# Patient Record
Sex: FEMALE | Race: WHITE | NOT HISPANIC OR LATINO
[De-identification: names, ages, dates, MRNs, and addresses within clinical notes are randomized per-mention and may not be internally consistent; named-entity substitution may affect disease eponyms.]

---

## 2017-09-12 ENCOUNTER — APPOINTMENT (OUTPATIENT)
Dept: ANTEPARTUM | Facility: CLINIC | Age: 22
End: 2017-09-12
Payer: MEDICAID

## 2017-09-12 ENCOUNTER — ASOB RESULT (OUTPATIENT)
Age: 22
End: 2017-09-12

## 2017-09-12 PROCEDURE — 76813 OB US NUCHAL MEAS 1 GEST: CPT

## 2017-09-12 PROCEDURE — 36416 COLLJ CAPILLARY BLOOD SPEC: CPT

## 2017-09-12 PROCEDURE — 76801 OB US < 14 WKS SINGLE FETUS: CPT

## 2017-11-06 ENCOUNTER — APPOINTMENT (OUTPATIENT)
Dept: ANTEPARTUM | Facility: CLINIC | Age: 22
End: 2017-11-06
Payer: MEDICAID

## 2017-11-06 ENCOUNTER — ASOB RESULT (OUTPATIENT)
Age: 22
End: 2017-11-06

## 2017-11-06 ENCOUNTER — LABORATORY RESULT (OUTPATIENT)
Age: 22
End: 2017-11-06

## 2017-11-06 PROCEDURE — 76811 OB US DETAILED SNGL FETUS: CPT

## 2017-12-09 ENCOUNTER — RESULT REVIEW (OUTPATIENT)
Age: 22
End: 2017-12-09

## 2017-12-09 ENCOUNTER — INPATIENT (INPATIENT)
Facility: HOSPITAL | Age: 22
LOS: 3 days | Discharge: HOME | End: 2017-12-13
Attending: OBSTETRICS & GYNECOLOGY | Admitting: OBSTETRICS & GYNECOLOGY
Payer: MEDICAID

## 2017-12-09 VITALS
HEART RATE: 73 BPM | OXYGEN SATURATION: 100 % | SYSTOLIC BLOOD PRESSURE: 115 MMHG | DIASTOLIC BLOOD PRESSURE: 57 MMHG | RESPIRATION RATE: 20 BRPM

## 2017-12-09 DIAGNOSIS — Z3A.00 WEEKS OF GESTATION OF PREGNANCY NOT SPECIFIED: ICD-10-CM

## 2017-12-09 DIAGNOSIS — O26.899 OTHER SPECIFIED PREGNANCY RELATED CONDITIONS, UNSPECIFIED TRIMESTER: ICD-10-CM

## 2017-12-09 LAB
BASOPHILS # BLD AUTO: 0.04 K/UL — SIGNIFICANT CHANGE UP (ref 0–0.2)
BASOPHILS NFR BLD AUTO: 0.4 % — SIGNIFICANT CHANGE UP (ref 0–2)
EOSINOPHIL # BLD AUTO: 0.18 K/UL — SIGNIFICANT CHANGE UP (ref 0–0.5)
EOSINOPHIL NFR BLD AUTO: 1.6 % — SIGNIFICANT CHANGE UP (ref 0–6)
HBV SURFACE AG SER-ACNC: NEGATIVE — SIGNIFICANT CHANGE UP
HCT VFR BLD CALC: 38.8 % — SIGNIFICANT CHANGE UP (ref 34.5–45)
HGB BLD-MCNC: 12.8 G/DL — SIGNIFICANT CHANGE UP (ref 11.5–15.5)
HIV1 AG SER QL: SIGNIFICANT CHANGE UP
HIV1+2 AB SPEC QL: SIGNIFICANT CHANGE UP
IMM GRANULOCYTES # BLD AUTO: 0.21 # — SIGNIFICANT CHANGE UP
IMM GRANULOCYTES NFR BLD AUTO: 1.9 % — HIGH (ref 0–1.5)
LYMPHOCYTES # BLD AUTO: 18.4 % — SIGNIFICANT CHANGE UP (ref 13–44)
LYMPHOCYTES # BLD AUTO: 2.06 K/UL — SIGNIFICANT CHANGE UP (ref 1–3.3)
MCHC RBC-ENTMCNC: 28.1 PG — SIGNIFICANT CHANGE UP (ref 27–34)
MCHC RBC-ENTMCNC: 33 % — SIGNIFICANT CHANGE UP (ref 32–36)
MCV RBC AUTO: 85.1 FL — SIGNIFICANT CHANGE UP (ref 80–100)
MONOCYTES # BLD AUTO: 0.79 K/UL — SIGNIFICANT CHANGE UP (ref 0–0.9)
MONOCYTES NFR BLD AUTO: 7.1 % — SIGNIFICANT CHANGE UP (ref 2–14)
NEUTROPHILS # BLD AUTO: 7.92 K/UL — HIGH (ref 1.8–7.4)
NEUTROPHILS NFR BLD AUTO: 70.6 % — SIGNIFICANT CHANGE UP (ref 43–77)
NRBC # FLD: 0 — SIGNIFICANT CHANGE UP
PLATELET # BLD AUTO: 157 K/UL — SIGNIFICANT CHANGE UP (ref 150–400)
PMV BLD: 12.8 FL — SIGNIFICANT CHANGE UP (ref 7–13)
RBC # BLD: 4.56 M/UL — SIGNIFICANT CHANGE UP (ref 3.8–5.2)
RBC # FLD: 13.8 % — SIGNIFICANT CHANGE UP (ref 10.3–14.5)
RH IG SCN BLD-IMP: POSITIVE — SIGNIFICANT CHANGE UP
WBC # BLD: 11.2 K/UL — HIGH (ref 3.8–10.5)
WBC # FLD AUTO: 11.2 K/UL — HIGH (ref 3.8–10.5)

## 2017-12-09 PROCEDURE — 88307 TISSUE EXAM BY PATHOLOGIST: CPT | Mod: 26

## 2017-12-09 RX ORDER — HYDROMORPHONE HYDROCHLORIDE 2 MG/ML
0.6 INJECTION INTRAMUSCULAR; INTRAVENOUS; SUBCUTANEOUS
Qty: 0 | Refills: 0 | Status: DISCONTINUED | OUTPATIENT
Start: 2017-12-09 | End: 2017-12-10

## 2017-12-09 RX ORDER — MAGNESIUM SULFATE 500 MG/ML
2 VIAL (ML) INJECTION
Qty: 40 | Refills: 0 | Status: DISCONTINUED | OUTPATIENT
Start: 2017-12-09 | End: 2017-12-09

## 2017-12-09 RX ORDER — MAGNESIUM SULFATE 500 MG/ML
4 VIAL (ML) INJECTION ONCE
Qty: 0 | Refills: 0 | Status: DISCONTINUED | OUTPATIENT
Start: 2017-12-09 | End: 2017-12-09

## 2017-12-09 RX ORDER — HYDROMORPHONE HYDROCHLORIDE 2 MG/ML
30 INJECTION INTRAMUSCULAR; INTRAVENOUS; SUBCUTANEOUS
Qty: 0 | Refills: 0 | Status: DISCONTINUED | OUTPATIENT
Start: 2017-12-09 | End: 2017-12-11

## 2017-12-09 RX ORDER — AMPICILLIN TRIHYDRATE 250 MG
1 CAPSULE ORAL EVERY 4 HOURS
Qty: 0 | Refills: 0 | Status: CANCELLED | OUTPATIENT
Start: 2017-12-10 | End: 2017-12-09

## 2017-12-09 RX ORDER — CITRIC ACID/SODIUM CITRATE 300-500 MG
30 SOLUTION, ORAL ORAL ONCE
Qty: 0 | Refills: 0 | Status: COMPLETED | OUTPATIENT
Start: 2017-12-09 | End: 2017-12-09

## 2017-12-09 RX ORDER — MAGNESIUM SULFATE 500 MG/ML
4 VIAL (ML) INJECTION ONCE
Qty: 0 | Refills: 0 | Status: COMPLETED | OUTPATIENT
Start: 2017-12-09 | End: 2017-12-09

## 2017-12-09 RX ORDER — SODIUM CHLORIDE 9 MG/ML
1000 INJECTION, SOLUTION INTRAVENOUS
Qty: 0 | Refills: 0 | Status: DISCONTINUED | OUTPATIENT
Start: 2017-12-09 | End: 2017-12-09

## 2017-12-09 RX ORDER — AMPICILLIN TRIHYDRATE 250 MG
2 CAPSULE ORAL ONCE
Qty: 0 | Refills: 0 | Status: DISCONTINUED | OUTPATIENT
Start: 2017-12-09 | End: 2017-12-09

## 2017-12-09 RX ORDER — FAMOTIDINE 10 MG/ML
20 INJECTION INTRAVENOUS ONCE
Qty: 0 | Refills: 0 | Status: COMPLETED | OUTPATIENT
Start: 2017-12-09 | End: 2017-12-09

## 2017-12-09 RX ORDER — SODIUM CHLORIDE 9 MG/ML
1000 INJECTION, SOLUTION INTRAVENOUS ONCE
Qty: 0 | Refills: 0 | Status: DISCONTINUED | OUTPATIENT
Start: 2017-12-09 | End: 2017-12-09

## 2017-12-09 RX ORDER — ONDANSETRON 8 MG/1
4 TABLET, FILM COATED ORAL EVERY 6 HOURS
Qty: 0 | Refills: 0 | Status: DISCONTINUED | OUTPATIENT
Start: 2017-12-09 | End: 2017-12-13

## 2017-12-09 RX ORDER — HYDROMORPHONE HYDROCHLORIDE 2 MG/ML
0.6 INJECTION INTRAMUSCULAR; INTRAVENOUS; SUBCUTANEOUS
Qty: 0 | Refills: 0 | Status: DISCONTINUED | OUTPATIENT
Start: 2017-12-09 | End: 2017-12-11

## 2017-12-09 RX ORDER — AMPICILLIN TRIHYDRATE 250 MG
CAPSULE ORAL
Qty: 0 | Refills: 0 | Status: DISCONTINUED | OUTPATIENT
Start: 2017-12-09 | End: 2017-12-09

## 2017-12-09 RX ORDER — OXYTOCIN 10 UNIT/ML
333.33 VIAL (ML) INJECTION
Qty: 20 | Refills: 0 | Status: DISCONTINUED | OUTPATIENT
Start: 2017-12-09 | End: 2017-12-09

## 2017-12-09 RX ORDER — NALOXONE HYDROCHLORIDE 4 MG/.1ML
0.1 SPRAY NASAL
Qty: 0 | Refills: 0 | Status: DISCONTINUED | OUTPATIENT
Start: 2017-12-09 | End: 2017-12-13

## 2017-12-09 RX ORDER — METOCLOPRAMIDE HCL 10 MG
10 TABLET ORAL ONCE
Qty: 0 | Refills: 0 | Status: COMPLETED | OUTPATIENT
Start: 2017-12-09 | End: 2017-12-09

## 2017-12-09 RX ADMIN — FAMOTIDINE 20 MILLIGRAM(S): 10 INJECTION INTRAVENOUS at 21:46

## 2017-12-09 RX ADMIN — Medication 30 MILLILITER(S): at 21:46

## 2017-12-09 RX ADMIN — HYDROMORPHONE HYDROCHLORIDE 0.6 MILLIGRAM(S): 2 INJECTION INTRAMUSCULAR; INTRAVENOUS; SUBCUTANEOUS at 23:40

## 2017-12-09 RX ADMIN — Medication 10 MILLIGRAM(S): at 21:46

## 2017-12-09 RX ADMIN — HYDROMORPHONE HYDROCHLORIDE 0.6 MILLIGRAM(S): 2 INJECTION INTRAMUSCULAR; INTRAVENOUS; SUBCUTANEOUS at 23:29

## 2017-12-09 RX ADMIN — HYDROMORPHONE HYDROCHLORIDE 30 MILLILITER(S): 2 INJECTION INTRAMUSCULAR; INTRAVENOUS; SUBCUTANEOUS at 23:44

## 2017-12-09 RX ADMIN — Medication 12 MILLIGRAM(S): at 21:39

## 2017-12-09 RX ADMIN — Medication 300 GRAM(S): at 21:45

## 2017-12-10 DIAGNOSIS — O26.899 OTHER SPECIFIED PREGNANCY RELATED CONDITIONS, UNSPECIFIED TRIMESTER: ICD-10-CM

## 2017-12-10 LAB
ALBUMIN SERPL ELPH-MCNC: 3.6 G/DL — SIGNIFICANT CHANGE UP (ref 3.3–5)
ALP SERPL-CCNC: 81 U/L — SIGNIFICANT CHANGE UP (ref 40–120)
ALT FLD-CCNC: 25 U/L — SIGNIFICANT CHANGE UP (ref 4–33)
AST SERPL-CCNC: 26 U/L — SIGNIFICANT CHANGE UP (ref 4–32)
BASOPHILS # BLD AUTO: 0.04 K/UL — SIGNIFICANT CHANGE UP (ref 0–0.2)
BASOPHILS # BLD AUTO: 0.04 K/UL — SIGNIFICANT CHANGE UP (ref 0–0.2)
BASOPHILS NFR BLD AUTO: 0.2 % — SIGNIFICANT CHANGE UP (ref 0–2)
BASOPHILS NFR BLD AUTO: 0.2 % — SIGNIFICANT CHANGE UP (ref 0–2)
BASOPHILS NFR SPEC: 0 % — SIGNIFICANT CHANGE UP (ref 0–2)
BILIRUB SERPL-MCNC: < 0.2 MG/DL — LOW (ref 0.2–1.2)
BLASTS # FLD: 0 % — SIGNIFICANT CHANGE UP (ref 0–0)
BUN SERPL-MCNC: 5 MG/DL — LOW (ref 7–23)
CALCIUM SERPL-MCNC: 8.7 MG/DL — SIGNIFICANT CHANGE UP (ref 8.4–10.5)
CHLORIDE SERPL-SCNC: 102 MMOL/L — SIGNIFICANT CHANGE UP (ref 98–107)
CO2 SERPL-SCNC: 23 MMOL/L — SIGNIFICANT CHANGE UP (ref 22–31)
CREAT SERPL-MCNC: 0.34 MG/DL — LOW (ref 0.5–1.3)
EOSINOPHIL # BLD AUTO: 0.01 K/UL — SIGNIFICANT CHANGE UP (ref 0–0.5)
EOSINOPHIL # BLD AUTO: 0.04 K/UL — SIGNIFICANT CHANGE UP (ref 0–0.5)
EOSINOPHIL NFR BLD AUTO: 0.1 % — SIGNIFICANT CHANGE UP (ref 0–6)
EOSINOPHIL NFR BLD AUTO: 0.2 % — SIGNIFICANT CHANGE UP (ref 0–6)
EOSINOPHIL NFR FLD: 0 % — SIGNIFICANT CHANGE UP (ref 0–6)
GIANT PLATELETS BLD QL SMEAR: PRESENT — SIGNIFICANT CHANGE UP
GLUCOSE SERPL-MCNC: 100 MG/DL — HIGH (ref 70–99)
HCT VFR BLD CALC: 37.6 % — SIGNIFICANT CHANGE UP (ref 34.5–45)
HCT VFR BLD CALC: 38.9 % — SIGNIFICANT CHANGE UP (ref 34.5–45)
HGB BLD-MCNC: 12.7 G/DL — SIGNIFICANT CHANGE UP (ref 11.5–15.5)
HGB BLD-MCNC: 12.9 G/DL — SIGNIFICANT CHANGE UP (ref 11.5–15.5)
IMM GRANULOCYTES # BLD AUTO: 0.23 # — SIGNIFICANT CHANGE UP
IMM GRANULOCYTES # BLD AUTO: 0.25 # — SIGNIFICANT CHANGE UP
IMM GRANULOCYTES NFR BLD AUTO: 1.2 % — SIGNIFICANT CHANGE UP (ref 0–1.5)
IMM GRANULOCYTES NFR BLD AUTO: 1.3 % — SIGNIFICANT CHANGE UP (ref 0–1.5)
LYMPHOCYTES # BLD AUTO: 0.88 K/UL — LOW (ref 1–3.3)
LYMPHOCYTES # BLD AUTO: 1.64 K/UL — SIGNIFICANT CHANGE UP (ref 1–3.3)
LYMPHOCYTES # BLD AUTO: 4.7 % — LOW (ref 13–44)
LYMPHOCYTES # BLD AUTO: 8.5 % — LOW (ref 13–44)
LYMPHOCYTES NFR SPEC AUTO: 2.6 % — LOW (ref 13–44)
MCHC RBC-ENTMCNC: 28.2 PG — SIGNIFICANT CHANGE UP (ref 27–34)
MCHC RBC-ENTMCNC: 29 PG — SIGNIFICANT CHANGE UP (ref 27–34)
MCHC RBC-ENTMCNC: 33.2 % — SIGNIFICANT CHANGE UP (ref 32–36)
MCHC RBC-ENTMCNC: 33.8 % — SIGNIFICANT CHANGE UP (ref 32–36)
MCV RBC AUTO: 84.9 FL — SIGNIFICANT CHANGE UP (ref 80–100)
MCV RBC AUTO: 85.8 FL — SIGNIFICANT CHANGE UP (ref 80–100)
METAMYELOCYTES # FLD: 0 % — SIGNIFICANT CHANGE UP (ref 0–1)
MONOCYTES # BLD AUTO: 0.6 K/UL — SIGNIFICANT CHANGE UP (ref 0–0.9)
MONOCYTES # BLD AUTO: 0.68 K/UL — SIGNIFICANT CHANGE UP (ref 0–0.9)
MONOCYTES NFR BLD AUTO: 3.2 % — SIGNIFICANT CHANGE UP (ref 2–14)
MONOCYTES NFR BLD AUTO: 3.5 % — SIGNIFICANT CHANGE UP (ref 2–14)
MONOCYTES NFR BLD: 2.6 % — SIGNIFICANT CHANGE UP (ref 2–9)
MORPHOLOGY BLD-IMP: NORMAL — SIGNIFICANT CHANGE UP
MYELOCYTES NFR BLD: 0 % — SIGNIFICANT CHANGE UP (ref 0–0)
NEUTROPHIL AB SER-ACNC: 92.2 % — HIGH (ref 43–77)
NEUTROPHILS # BLD AUTO: 16.72 K/UL — HIGH (ref 1.8–7.4)
NEUTROPHILS # BLD AUTO: 16.81 K/UL — HIGH (ref 1.8–7.4)
NEUTROPHILS NFR BLD AUTO: 86.3 % — HIGH (ref 43–77)
NEUTROPHILS NFR BLD AUTO: 90.6 % — HIGH (ref 43–77)
NEUTS BAND # BLD: 0 % — SIGNIFICANT CHANGE UP (ref 0–6)
NRBC # FLD: 0 — SIGNIFICANT CHANGE UP
NRBC # FLD: 0 — SIGNIFICANT CHANGE UP
OTHER - HEMATOLOGY %: 0 — SIGNIFICANT CHANGE UP
PLATELET # BLD AUTO: 145 K/UL — LOW (ref 150–400)
PLATELET # BLD AUTO: 85 K/UL — LOW (ref 150–400)
PLATELET COUNT - ESTIMATE: SIGNIFICANT CHANGE UP
PMV BLD: 12.5 FL — SIGNIFICANT CHANGE UP (ref 7–13)
PMV BLD: 12.6 FL — SIGNIFICANT CHANGE UP (ref 7–13)
POTASSIUM SERPL-MCNC: 3.9 MMOL/L — SIGNIFICANT CHANGE UP (ref 3.5–5.3)
POTASSIUM SERPL-SCNC: 3.9 MMOL/L — SIGNIFICANT CHANGE UP (ref 3.5–5.3)
PROMYELOCYTES # FLD: 0 % — SIGNIFICANT CHANGE UP (ref 0–0)
PROT SERPL-MCNC: 6.7 G/DL — SIGNIFICANT CHANGE UP (ref 6–8.3)
RBC # BLD: 4.38 M/UL — SIGNIFICANT CHANGE UP (ref 3.8–5.2)
RBC # BLD: 4.58 M/UL — SIGNIFICANT CHANGE UP (ref 3.8–5.2)
RBC # FLD: 13.7 % — SIGNIFICANT CHANGE UP (ref 10.3–14.5)
RBC # FLD: 13.7 % — SIGNIFICANT CHANGE UP (ref 10.3–14.5)
SODIUM SERPL-SCNC: 139 MMOL/L — SIGNIFICANT CHANGE UP (ref 135–145)
VARIANT LYMPHS # BLD: 2.6 % — SIGNIFICANT CHANGE UP
WBC # BLD: 18.57 K/UL — HIGH (ref 3.8–10.5)
WBC # BLD: 19.37 K/UL — HIGH (ref 3.8–10.5)
WBC # FLD AUTO: 18.57 K/UL — HIGH (ref 3.8–10.5)
WBC # FLD AUTO: 19.37 K/UL — HIGH (ref 3.8–10.5)

## 2017-12-10 RX ORDER — SODIUM CHLORIDE 9 MG/ML
1000 INJECTION, SOLUTION INTRAVENOUS
Qty: 0 | Refills: 0 | Status: DISCONTINUED | OUTPATIENT
Start: 2017-12-10 | End: 2017-12-12

## 2017-12-10 RX ORDER — SODIUM CHLORIDE 9 MG/ML
1000 INJECTION, SOLUTION INTRAVENOUS
Qty: 0 | Refills: 0 | Status: DISCONTINUED | OUTPATIENT
Start: 2017-12-10 | End: 2017-12-10

## 2017-12-10 RX ORDER — HEPARIN SODIUM 5000 [USP'U]/ML
5000 INJECTION INTRAVENOUS; SUBCUTANEOUS EVERY 12 HOURS
Qty: 0 | Refills: 0 | Status: DISCONTINUED | OUTPATIENT
Start: 2017-12-10 | End: 2017-12-13

## 2017-12-10 RX ORDER — FERROUS SULFATE 325(65) MG
325 TABLET ORAL DAILY
Qty: 0 | Refills: 0 | Status: DISCONTINUED | OUTPATIENT
Start: 2017-12-10 | End: 2017-12-13

## 2017-12-10 RX ORDER — GLYCERIN ADULT
1 SUPPOSITORY, RECTAL RECTAL AT BEDTIME
Qty: 0 | Refills: 0 | Status: DISCONTINUED | OUTPATIENT
Start: 2017-12-10 | End: 2017-12-13

## 2017-12-10 RX ORDER — TETANUS TOXOID, REDUCED DIPHTHERIA TOXOID AND ACELLULAR PERTUSSIS VACCINE, ADSORBED 5; 2.5; 8; 8; 2.5 [IU]/.5ML; [IU]/.5ML; UG/.5ML; UG/.5ML; UG/.5ML
0.5 SUSPENSION INTRAMUSCULAR ONCE
Qty: 0 | Refills: 0 | Status: DISCONTINUED | OUTPATIENT
Start: 2017-12-10 | End: 2017-12-13

## 2017-12-10 RX ORDER — LANOLIN
1 OINTMENT (GRAM) TOPICAL
Qty: 0 | Refills: 0 | Status: DISCONTINUED | OUTPATIENT
Start: 2017-12-10 | End: 2017-12-13

## 2017-12-10 RX ORDER — DIPHENHYDRAMINE HCL 50 MG
25 CAPSULE ORAL EVERY 6 HOURS
Qty: 0 | Refills: 0 | Status: DISCONTINUED | OUTPATIENT
Start: 2017-12-10 | End: 2017-12-13

## 2017-12-10 RX ORDER — OXYTOCIN 10 UNIT/ML
333.33 VIAL (ML) INJECTION
Qty: 20 | Refills: 0 | Status: DISCONTINUED | OUTPATIENT
Start: 2017-12-10 | End: 2017-12-13

## 2017-12-10 RX ORDER — SIMETHICONE 80 MG/1
80 TABLET, CHEWABLE ORAL EVERY 4 HOURS
Qty: 0 | Refills: 0 | Status: DISCONTINUED | OUTPATIENT
Start: 2017-12-10 | End: 2017-12-13

## 2017-12-10 RX ORDER — DOCUSATE SODIUM 100 MG
100 CAPSULE ORAL
Qty: 0 | Refills: 0 | Status: DISCONTINUED | OUTPATIENT
Start: 2017-12-10 | End: 2017-12-13

## 2017-12-10 RX ORDER — OXYTOCIN 10 UNIT/ML
41.67 VIAL (ML) INJECTION
Qty: 20 | Refills: 0 | Status: DISCONTINUED | OUTPATIENT
Start: 2017-12-10 | End: 2017-12-13

## 2017-12-10 RX ORDER — INFLUENZA VIRUS VACCINE 15; 15; 15; 15 UG/.5ML; UG/.5ML; UG/.5ML; UG/.5ML
0.5 SUSPENSION INTRAMUSCULAR ONCE
Qty: 0 | Refills: 0 | Status: DISCONTINUED | OUTPATIENT
Start: 2017-12-10 | End: 2017-12-13

## 2017-12-10 RX ADMIN — Medication 125 MILLIUNIT(S)/MIN: at 00:29

## 2017-12-10 RX ADMIN — HEPARIN SODIUM 5000 UNIT(S): 5000 INJECTION INTRAVENOUS; SUBCUTANEOUS at 09:41

## 2017-12-10 RX ADMIN — HYDROMORPHONE HYDROCHLORIDE 30 MILLILITER(S): 2 INJECTION INTRAMUSCULAR; INTRAVENOUS; SUBCUTANEOUS at 07:32

## 2017-12-10 RX ADMIN — SIMETHICONE 80 MILLIGRAM(S): 80 TABLET, CHEWABLE ORAL at 19:51

## 2017-12-10 RX ADMIN — Medication 325 MILLIGRAM(S): at 22:29

## 2017-12-10 RX ADMIN — HYDROMORPHONE HYDROCHLORIDE 30 MILLILITER(S): 2 INJECTION INTRAMUSCULAR; INTRAVENOUS; SUBCUTANEOUS at 19:32

## 2017-12-10 RX ADMIN — HEPARIN SODIUM 5000 UNIT(S): 5000 INJECTION INTRAVENOUS; SUBCUTANEOUS at 22:29

## 2017-12-10 RX ADMIN — SODIUM CHLORIDE 125 MILLILITER(S): 9 INJECTION, SOLUTION INTRAVENOUS at 19:40

## 2017-12-10 RX ADMIN — Medication 1000 MILLIUNIT(S)/MIN: at 00:28

## 2017-12-10 NOTE — PROGRESS NOTE ADULT - PROBLEM SELECTOR PLAN 1
- check CBC  - continue with PO analgesia  - increase ambulation  - continue regular diet  - encourage incentive spirometry  - d/c IV fluids this PM  - d/c iftikahr this PM  - incision dressing kept in place due to the fact that the pt's c/s was late    Sahara Mace MD PGY1

## 2017-12-10 NOTE — PROGRESS NOTE ADULT - SUBJECTIVE AND OBJECTIVE BOX
Patient seen and examined at bedside. No acute complaints, pain well controlled. Patient is not yet ambulating as she is in PACU. Pt is tolerating regular PO liquids. Has not yet passed flatus or had BM. Bay is still in place.    Vital Signs Last 24 Hours  T(C): --  HR: 87 (12-10-17 @ 04:00) (73 - 97)  BP: 125/69 (12-10-17 @ 04:00) (114/59 - 133/59)  RR: 15 (12-10-17 @ 04:00) (11 - 22)  SpO2: 97% (12-10-17 @ 04:00) (96% - 100%)    I&O's Summary    09 Dec 2017 07:01  -  10 Dec 2017 04:29  --------------------------------------------------------  IN: 2500 mL / OUT: 2450 mL / NET: 50 mL        Physical Exam:  General: NAD  Abdomen: soft, non-tender, non-distended, fundus firm  Incision: Pfannenstiel incision CDI, no drainage, no erythema, subcuticular suture closure, dressing in place  Pelvic: lochia wnl    Labs:  Blood Type: O Positive  Antibody Screen: --               12.9   19.37 )-----------( 145      ( 12-10 @ 00:07 )             38.9                12.8   11.20 )-----------( 157      ( 12-09 @ 22:00 )             38.8         MEDICATIONS  (STANDING):  heparin  Injectable 5000 Unit(s) SubCutaneous every 12 hours  HYDROmorphone PCA (1 mG/mL) 30 milliLiter(s) PCA Continuous PCA Continuous  influenza   Vaccine 0.5 milliLiter(s) IntraMuscular once  lactated ringers. 1000 milliLiter(s) (125 mL/Hr) IV Continuous <Continuous>  oxytocin Infusion 41.667 milliUNIT(s)/Min (125 mL/Hr) IV Continuous <Continuous>  oxytocin Infusion 333.333 milliUNIT(s)/Min (1000 mL/Hr) IV Continuous <Continuous>  oxytocin Infusion 41.667 milliUNIT(s)/Min (125 mL/Hr) IV Continuous <Continuous>    MEDICATIONS  (PRN):  HYDROmorphone  Injectable 0.6 milliGRAM(s) IV Push every 10 minutes PRN Moderate Pain  HYDROmorphone PCA (1 mG/mL) Rescue Clinician Bolus 0.6 milliGRAM(s) IV Push every 1 hour PRN Severe Pain (7 - 10)  naloxone Injectable 0.1 milliGRAM(s) IV Push every 3 minutes PRN For ANY of the following changes in patient status:  A. RR LESS THAN 10 breaths per minute, B. Oxygen saturation LESS THAN 90%, C. Sedation score of 6  ondansetron Injectable 4 milliGRAM(s) IV Push every 6 hours PRN Nausea

## 2017-12-10 NOTE — PROGRESS NOTE ADULT - SUBJECTIVE AND OBJECTIVE BOX
Postop Day  __1_ s/p   C- Section    THERAPY:  [  ] Spinal morphine   [  ] Epidural morphine   [x  ] IV PCA Hydromorphone 1 mg/ml      Sedation Score:	  [x  ] Alert	    [  ] Drowsy        [  ] Arousable	[  ] Asleep	[  ] Unresponsive    Side Effects:	  [x  ] None	     [  ] Nausea        [  ] Pruritus        [  ] Weakness   [  ] Numbness        ASSESSMENT/ PLAN   [   ] Discontinue         [  ] Continue  [ x ]Documentation and Verification of current medications     Comments:       Patient is doing well.  OOBAA. Tolerating clears.  Pain is tolerable.  No residual anesthetic issues or complications noted.

## 2017-12-11 LAB
RUBV IGG SER-ACNC: 8.8 INDEX — SIGNIFICANT CHANGE UP
RUBV IGG SER-IMP: POSITIVE — SIGNIFICANT CHANGE UP
T PALLIDUM AB TITR SER: NEGATIVE — SIGNIFICANT CHANGE UP

## 2017-12-11 RX ORDER — OXYCODONE HYDROCHLORIDE 5 MG/1
5 TABLET ORAL EVERY 4 HOURS
Qty: 0 | Refills: 0 | Status: COMPLETED | OUTPATIENT
Start: 2017-12-11 | End: 2017-12-18

## 2017-12-11 RX ORDER — IBUPROFEN 200 MG
600 TABLET ORAL EVERY 6 HOURS
Qty: 0 | Refills: 0 | Status: DISCONTINUED | OUTPATIENT
Start: 2017-12-11 | End: 2017-12-13

## 2017-12-11 RX ORDER — IBUPROFEN 200 MG
600 TABLET ORAL EVERY 6 HOURS
Qty: 0 | Refills: 0 | Status: COMPLETED | OUTPATIENT
Start: 2017-12-11 | End: 2018-11-09

## 2017-12-11 RX ORDER — OXYCODONE HYDROCHLORIDE 5 MG/1
5 TABLET ORAL
Qty: 0 | Refills: 0 | Status: DISCONTINUED | OUTPATIENT
Start: 2017-12-11 | End: 2017-12-13

## 2017-12-11 RX ORDER — OXYCODONE HYDROCHLORIDE 5 MG/1
5 TABLET ORAL EVERY 4 HOURS
Qty: 0 | Refills: 0 | Status: DISCONTINUED | OUTPATIENT
Start: 2017-12-11 | End: 2017-12-13

## 2017-12-11 RX ORDER — KETOROLAC TROMETHAMINE 30 MG/ML
30 SYRINGE (ML) INJECTION EVERY 6 HOURS
Qty: 0 | Refills: 0 | Status: DISCONTINUED | OUTPATIENT
Start: 2017-12-11 | End: 2017-12-12

## 2017-12-11 RX ORDER — OXYCODONE HYDROCHLORIDE 5 MG/1
5 TABLET ORAL
Qty: 0 | Refills: 0 | Status: COMPLETED | OUTPATIENT
Start: 2017-12-11 | End: 2017-12-18

## 2017-12-11 RX ORDER — ACETAMINOPHEN 500 MG
975 TABLET ORAL EVERY 6 HOURS
Qty: 0 | Refills: 0 | Status: DISCONTINUED | OUTPATIENT
Start: 2017-12-11 | End: 2017-12-13

## 2017-12-11 RX ADMIN — HYDROMORPHONE HYDROCHLORIDE 30 MILLILITER(S): 2 INJECTION INTRAMUSCULAR; INTRAVENOUS; SUBCUTANEOUS at 07:21

## 2017-12-11 RX ADMIN — HEPARIN SODIUM 5000 UNIT(S): 5000 INJECTION INTRAVENOUS; SUBCUTANEOUS at 22:13

## 2017-12-11 RX ADMIN — Medication 600 MILLIGRAM(S): at 11:35

## 2017-12-11 RX ADMIN — Medication 325 MILLIGRAM(S): at 12:14

## 2017-12-11 RX ADMIN — SIMETHICONE 80 MILLIGRAM(S): 80 TABLET, CHEWABLE ORAL at 22:13

## 2017-12-11 RX ADMIN — Medication 975 MILLIGRAM(S): at 10:44

## 2017-12-11 RX ADMIN — Medication 975 MILLIGRAM(S): at 19:34

## 2017-12-11 RX ADMIN — HEPARIN SODIUM 5000 UNIT(S): 5000 INJECTION INTRAVENOUS; SUBCUTANEOUS at 10:10

## 2017-12-11 RX ADMIN — SIMETHICONE 80 MILLIGRAM(S): 80 TABLET, CHEWABLE ORAL at 16:44

## 2017-12-11 RX ADMIN — Medication 600 MILLIGRAM(S): at 10:44

## 2017-12-11 RX ADMIN — SIMETHICONE 80 MILLIGRAM(S): 80 TABLET, CHEWABLE ORAL at 12:16

## 2017-12-11 RX ADMIN — Medication 100 MILLIGRAM(S): at 08:04

## 2017-12-11 RX ADMIN — Medication 975 MILLIGRAM(S): at 20:31

## 2017-12-11 RX ADMIN — Medication 975 MILLIGRAM(S): at 11:35

## 2017-12-11 RX ADMIN — SIMETHICONE 80 MILLIGRAM(S): 80 TABLET, CHEWABLE ORAL at 08:03

## 2017-12-11 RX ADMIN — Medication 1 TABLET(S): at 12:14

## 2017-12-11 NOTE — PROGRESS NOTE ADULT - PROBLEM SELECTOR PLAN 1
- Continue current pain regimen.   - Continue regular diet.  - Increase ambulation.      Rebeca Ramirez D.O. PGY1

## 2017-12-11 NOTE — PROGRESS NOTE ADULT - SUBJECTIVE AND OBJECTIVE BOX
OB Progress Note: CS, POD#2    S: 21yo  POD#2 s/p classical  section. Pain is well controlled. She is tolerating a regular diet and not yet passing flatus. She is voiding spontaneously, and ambulating without difficulty. Denies CP/SOB. Denies lightheadedness/dizziness. Denies N/V.    O:  Vitals:  Vital Signs Last 24 Hrs  T(C): 36.6 (11 Dec 2017 07:00), Max: 37.3 (10 Dec 2017 13:46)  T(F): 97.9 (11 Dec 2017 07:00), Max: 99.2 (10 Dec 2017 13:46)  HR: 76 (11 Dec 2017 07:00) (72 - 88)  BP: 108/45 (11 Dec 2017 07:00) (105/48 - 116/45)  BP(mean): --  RR: 17 (11 Dec 2017 07:00) (17 - 18)  SpO2: 96% (11 Dec 2017 07:00) (96% - 98%)    MEDICATIONS  (STANDING):  diphtheria/tetanus/pertussis (acellular) Vaccine (ADAcel) 0.5 milliLiter(s) IntraMuscular once  ferrous    sulfate 325 milliGRAM(s) Oral daily  heparin  Injectable 5000 Unit(s) SubCutaneous every 12 hours  HYDROmorphone PCA (1 mG/mL) 30 milliLiter(s) PCA Continuous PCA Continuous  influenza   Vaccine 0.5 milliLiter(s) IntraMuscular once  lactated ringers. 1000 milliLiter(s) (125 mL/Hr) IV Continuous <Continuous>  oxytocin Infusion 41.667 milliUNIT(s)/Min (125 mL/Hr) IV Continuous <Continuous>  oxytocin Infusion 333.333 milliUNIT(s)/Min (1000 mL/Hr) IV Continuous <Continuous>  oxytocin Infusion 41.667 milliUNIT(s)/Min (125 mL/Hr) IV Continuous <Continuous>  prenatal multivitamin 1 Tablet(s) Oral daily      MEDICATIONS  (PRN):  diphenhydrAMINE   Capsule 25 milliGRAM(s) Oral every 6 hours PRN Itching  docusate sodium 100 milliGRAM(s) Oral two times a day PRN Stool Softening  glycerin Suppository - Adult 1 Suppository(s) Rectal at bedtime PRN Constipation  HYDROmorphone PCA (1 mG/mL) Rescue Clinician Bolus 0.6 milliGRAM(s) IV Push every 1 hour PRN Severe Pain (7 - 10)  lanolin Ointment 1 Application(s) Topical every 3 hours PRN Sore Nipples  naloxone Injectable 0.1 milliGRAM(s) IV Push every 3 minutes PRN For ANY of the following changes in patient status:  A. RR LESS THAN 10 breaths per minute, B. Oxygen saturation LESS THAN 90%, C. Sedation score of 6  ondansetron Injectable 4 milliGRAM(s) IV Push every 6 hours PRN Nausea  simethicone 80 milliGRAM(s) Chew every 4 hours PRN Gas      Labs:  Blood type: O Positive  Rubella IgG: RPR:                           12.7   18.57<H> >-----------< 85<L>    ( 12-10 @ 04:00 )             37.6                        12.9   19.37<H> >-----------< 145<L>    ( 12-10 @ 00:07 )             38.9                        12.8   11.20<H> >-----------< 157    (  @ 22:00 )             38.8    12-10-17 @ 00:07      139  |  102  |  5<L>  ----------------------------<  100<H>  3.9   |  23  |  0.34<L>        Ca    8.7      10 Dec 2017 00:07    TPro  6.7  /  Alb  3.6  /  TBili  < 0.2<L>  /  DBili  x   /  AST  26  /  ALT  25  /  AlkPhos  81  12-10-17 @ 00:07          PE:  General: NAD  Abdomen: Soft, appropriately tender, incision c/d/i.  Pelvic: Lochia normal   Extremities: NTBL

## 2017-12-12 LAB
BASOPHILS # BLD AUTO: 0.03 K/UL — SIGNIFICANT CHANGE UP (ref 0–0.2)
BASOPHILS NFR BLD AUTO: 0.2 % — SIGNIFICANT CHANGE UP (ref 0–2)
EOSINOPHIL # BLD AUTO: 0.03 K/UL — SIGNIFICANT CHANGE UP (ref 0–0.5)
EOSINOPHIL NFR BLD AUTO: 0.2 % — SIGNIFICANT CHANGE UP (ref 0–6)
HCT VFR BLD CALC: 35.1 % — SIGNIFICANT CHANGE UP (ref 34.5–45)
HGB BLD-MCNC: 11.5 G/DL — SIGNIFICANT CHANGE UP (ref 11.5–15.5)
IMM GRANULOCYTES # BLD AUTO: 0.15 # — SIGNIFICANT CHANGE UP
IMM GRANULOCYTES NFR BLD AUTO: 1.2 % — SIGNIFICANT CHANGE UP (ref 0–1.5)
LYMPHOCYTES # BLD AUTO: 17 % — SIGNIFICANT CHANGE UP (ref 13–44)
LYMPHOCYTES # BLD AUTO: 2.14 K/UL — SIGNIFICANT CHANGE UP (ref 1–3.3)
MCHC RBC-ENTMCNC: 28.5 PG — SIGNIFICANT CHANGE UP (ref 27–34)
MCHC RBC-ENTMCNC: 32.8 % — SIGNIFICANT CHANGE UP (ref 32–36)
MCV RBC AUTO: 86.9 FL — SIGNIFICANT CHANGE UP (ref 80–100)
MONOCYTES # BLD AUTO: 0.94 K/UL — HIGH (ref 0–0.9)
MONOCYTES NFR BLD AUTO: 7.5 % — SIGNIFICANT CHANGE UP (ref 2–14)
NEUTROPHILS # BLD AUTO: 9.32 K/UL — HIGH (ref 1.8–7.4)
NEUTROPHILS NFR BLD AUTO: 73.9 % — SIGNIFICANT CHANGE UP (ref 43–77)
NRBC # FLD: 0 — SIGNIFICANT CHANGE UP
PLATELET # BLD AUTO: 161 K/UL — SIGNIFICANT CHANGE UP (ref 150–400)
PMV BLD: 12.9 FL — SIGNIFICANT CHANGE UP (ref 7–13)
RBC # BLD: 4.04 M/UL — SIGNIFICANT CHANGE UP (ref 3.8–5.2)
RBC # FLD: 14.2 % — SIGNIFICANT CHANGE UP (ref 10.3–14.5)
WBC # BLD: 12.61 K/UL — HIGH (ref 3.8–10.5)
WBC # FLD AUTO: 12.61 K/UL — HIGH (ref 3.8–10.5)

## 2017-12-12 RX ADMIN — Medication 975 MILLIGRAM(S): at 06:25

## 2017-12-12 RX ADMIN — HEPARIN SODIUM 5000 UNIT(S): 5000 INJECTION INTRAVENOUS; SUBCUTANEOUS at 11:12

## 2017-12-12 RX ADMIN — OXYCODONE HYDROCHLORIDE 5 MILLIGRAM(S): 5 TABLET ORAL at 21:41

## 2017-12-12 RX ADMIN — Medication 325 MILLIGRAM(S): at 11:12

## 2017-12-12 RX ADMIN — OXYCODONE HYDROCHLORIDE 5 MILLIGRAM(S): 5 TABLET ORAL at 22:11

## 2017-12-12 RX ADMIN — SIMETHICONE 80 MILLIGRAM(S): 80 TABLET, CHEWABLE ORAL at 06:29

## 2017-12-12 NOTE — PROGRESS NOTE ADULT - ASSESSMENT
A/P:  23yo  POD#3 s/p pClassical C/s at 25wk for footling/breech/NRFHT.   Patient is stable and is doing well post-operatively.

## 2017-12-12 NOTE — PROGRESS NOTE ADULT - PROBLEM SELECTOR PLAN 1
- Continue motrin, tylenol, oxycodone PRN for pain control.  - Increase ambulation  - Continue regular diet  - Discharge planning    Rebeca Ramirez DO PGY1

## 2017-12-12 NOTE — PROGRESS NOTE ADULT - SUBJECTIVE AND OBJECTIVE BOX
OB Postpartum Note:  Delivery, POD#3    S: 21yo  POD#3 s/p pClassical C/s at 25wk for footling/breech/NRFHT. The patient feels well.  Pain is well controlled. She is tolerating a regular diet and passing flatus. She is voiding spontaneously, and ambulating without difficulty. Denies CP/SOB. Denies lightheadedness/dizziness. Denies N/V.    O:  Vitals:  Vital Signs Last 24 Hrs  T(C): 36.7 (12 Dec 2017 06:40), Max: 37 (11 Dec 2017 14:20)  T(F): 98.1 (12 Dec 2017 06:40), Max: 98.6 (11 Dec 2017 14:20)  HR: 73 (12 Dec 2017 06:40) (71 - 79)  BP: 102/61 (12 Dec 2017 06:40) (101/44 - 118/59)  BP(mean): --  RR: 18 (12 Dec 2017 06:40) (18 - 18)  SpO2: 99% (12 Dec 2017 06:40) (97% - 100%)    MEDICATIONS  (STANDING):  acetaminophen   Tablet. 975 milliGRAM(s) Oral every 6 hours  diphtheria/tetanus/pertussis (acellular) Vaccine (ADAcel) 0.5 milliLiter(s) IntraMuscular once  ferrous    sulfate 325 milliGRAM(s) Oral daily  heparin  Injectable 5000 Unit(s) SubCutaneous every 12 hours  ibuprofen  Tablet 600 milliGRAM(s) Oral every 6 hours  influenza   Vaccine 0.5 milliLiter(s) IntraMuscular once  oxyCODONE    IR 5 milliGRAM(s) Oral every 3 hours  oxytocin Infusion 41.667 milliUNIT(s)/Min (125 mL/Hr) IV Continuous <Continuous>  oxytocin Infusion 333.333 milliUNIT(s)/Min (1000 mL/Hr) IV Continuous <Continuous>  oxytocin Infusion 41.667 milliUNIT(s)/Min (125 mL/Hr) IV Continuous <Continuous>  prenatal multivitamin 1 Tablet(s) Oral daily    MEDICATIONS  (PRN):  diphenhydrAMINE   Capsule 25 milliGRAM(s) Oral every 6 hours PRN Itching  docusate sodium 100 milliGRAM(s) Oral two times a day PRN Stool Softening  glycerin Suppository - Adult 1 Suppository(s) Rectal at bedtime PRN Constipation  lanolin Ointment 1 Application(s) Topical every 3 hours PRN Sore Nipples  naloxone Injectable 0.1 milliGRAM(s) IV Push every 3 minutes PRN For ANY of the following changes in patient status:  A. RR LESS THAN 10 breaths per minute, B. Oxygen saturation LESS THAN 90%, C. Sedation score of 6  ondansetron Injectable 4 milliGRAM(s) IV Push every 6 hours PRN Nausea  oxyCODONE    IR 5 milliGRAM(s) Oral every 4 hours PRN Severe Pain (7 - 10)  simethicone 80 milliGRAM(s) Chew every 4 hours PRN Gas      LABS:  Blood type: O Positive  Rubella IgG: Positive ( @ 22:00)  RPR: Negative                          11.5   12.61<H> >-----------< 161    (  @ 06:25 )             35.1                        12.7   18.57<H> >-----------< 85<L>    ( 12-10 @ 04:00 )             37.6                        12.9   19.37<H> >-----------< 145<L>    ( 12-10 @ 00:07 )             38.9                        12.8   11.20<H> >-----------< 157    (  @ 22:00 )             38.8    12-10-17 @ 00:07      139  |  102  |  5<L>  ----------------------------<  100<H>  3.9   |  23  |  0.34<L>        Ca    8.7      10 Dec 2017 00:07    TPro  6.7  /  Alb  3.6  /  TBili  < 0.2<L>  /  DBili  x   /  AST  26  /  ALT  25  /  AlkPhos  81  12-10-17 @ 00:07          Physical exam:  Gen: NAD  Abdomen: Soft, nontender, no distension , firm uterine fundus  Incision: Clean, dry, and intact   Pelvic: Normal lochia noted  Ext: No calf tenderness

## 2017-12-13 ENCOUNTER — TRANSCRIPTION ENCOUNTER (OUTPATIENT)
Age: 22
End: 2017-12-13

## 2017-12-13 VITALS
HEART RATE: 84 BPM | SYSTOLIC BLOOD PRESSURE: 99 MMHG | OXYGEN SATURATION: 98 % | TEMPERATURE: 98 F | DIASTOLIC BLOOD PRESSURE: 63 MMHG | RESPIRATION RATE: 18 BRPM

## 2017-12-13 RX ADMIN — SIMETHICONE 80 MILLIGRAM(S): 80 TABLET, CHEWABLE ORAL at 12:37

## 2017-12-13 RX ADMIN — Medication 100 MILLIGRAM(S): at 12:37

## 2017-12-13 NOTE — DISCHARGE NOTE OB - MEDICATION SUMMARY - MEDICATIONS TO TAKE
I will START or STAY ON the medications listed below when I get home from the hospital:    Prenatal Vitamins  -- 1 tab(s) by mouth once a day  -- Indication: For Prenatal Vitamins    Advil 200 mg oral tablet  -- 3 tab(s) by mouth every 8 hours, As Needed  -- Indication: For Pain    Tylenol  -- 2 tab(s) by mouth every 6 hours, As Needed  -- Indication: For Pain

## 2017-12-13 NOTE — DISCHARGE NOTE OB - PATIENT PORTAL LINK FT
“You can access the [employer] Patient Portal, offered by [facility], by registering with the following website: [email or web address]”

## 2017-12-13 NOTE — DISCHARGE NOTE OB - PLAN OF CARE
Return to usual daily living activities Follow up in the office in 2 weeks for incision check. Call [phone] for an appointment.   Regular Diet.

## 2017-12-13 NOTE — PROGRESS NOTE ADULT - PROBLEM SELECTOR PLAN 1
- Continue motrin, tylenol, oxycodone PRN for pain control.  - Increase ambulation  - Continue regular diet  - Discharge planning    Jackie Elizabeth, PGY2

## 2017-12-13 NOTE — DISCHARGE NOTE OB - CARE PROVIDER_API CALL
Alec Baer), OBSGYN  General  [address] [place] [address]  Phone: [phone]  Fax: [phone]

## 2017-12-13 NOTE — DISCHARGE NOTE OB - HOSPITAL COURSE
Patient presented to triage unit in  labor with cervix fully dilated and bilateral lower fetal extremities present in the vagina. She underwent an emergency classical  section under general anesthesia resulting in the delivery of viable fetus which was transferred to the  intensive care unit for evaluation and management of extreme prematurity.     The patient's postoperative course is unremarkable and on postoperative day 4, the patient meets all discharge criteria. She is discharged home in stable condition with follow up in the office in 2 weeks for incision check.

## 2017-12-13 NOTE — PROGRESS NOTE ADULT - SUBJECTIVE AND OBJECTIVE BOX
OB Postpartum Note: Primary  Delivery, POD#3    S: 21yo  POD#4 s/p pLTCS for NRFHT. The patient feels well.  Pain is well controlled. She is tolerating a regular diet and passing flatus. Pt had a bowel movement yesterday. She is voiding spontaneously, and ambulating without difficulty. Denies CP/SOB. Denies lightheadedness/dizziness. Denies N/V.    O:  Vitals:  Vital Signs Last 24 Hrs  T(C): 36.6 (13 Dec 2017 04:04), Max: 37.3 (12 Dec 2017 13:45)  T(F): 97.9 (13 Dec 2017 04:04), Max: 99.1 (12 Dec 2017 13:45)  HR: 84 (13 Dec 2017 04:04) (80 - 84)  BP: 99/63 (13 Dec 2017 04:04) (99/63 - 102/52)  RR: 18 (13 Dec 2017 04:04) (18 - 18)  SpO2: 98% (13 Dec 2017 04:04) (98% - 99%)    MEDICATIONS  (STANDING):  acetaminophen   Tablet. 975 milliGRAM(s) Oral every 6 hours  diphtheria/tetanus/pertussis (acellular) Vaccine (ADAcel) 0.5 milliLiter(s) IntraMuscular once  ferrous    sulfate 325 milliGRAM(s) Oral daily  heparin  Injectable 5000 Unit(s) SubCutaneous every 12 hours  ibuprofen  Tablet 600 milliGRAM(s) Oral every 6 hours  influenza   Vaccine 0.5 milliLiter(s) IntraMuscular once  oxyCODONE    IR 5 milliGRAM(s) Oral every 3 hours  oxytocin Infusion 41.667 milliUNIT(s)/Min (125 mL/Hr) IV Continuous <Continuous>  oxytocin Infusion 333.333 milliUNIT(s)/Min (1000 mL/Hr) IV Continuous <Continuous>  oxytocin Infusion 41.667 milliUNIT(s)/Min (125 mL/Hr) IV Continuous <Continuous>  prenatal multivitamin 1 Tablet(s) Oral daily    MEDICATIONS  (PRN):  diphenhydrAMINE   Capsule 25 milliGRAM(s) Oral every 6 hours PRN Itching  docusate sodium 100 milliGRAM(s) Oral two times a day PRN Stool Softening  glycerin Suppository - Adult 1 Suppository(s) Rectal at bedtime PRN Constipation  lanolin Ointment 1 Application(s) Topical every 3 hours PRN Sore Nipples  naloxone Injectable 0.1 milliGRAM(s) IV Push every 3 minutes PRN For ANY of the following changes in patient status:  A. RR LESS THAN 10 breaths per minute, B. Oxygen saturation LESS THAN 90%, C. Sedation score of 6  ondansetron Injectable 4 milliGRAM(s) IV Push every 6 hours PRN Nausea  oxyCODONE    IR 5 milliGRAM(s) Oral every 4 hours PRN Severe Pain (7 - 10)  simethicone 80 milliGRAM(s) Chew every 4 hours PRN Gas      LABS:  Blood type: O Positive  Rubella IgG: Positive ( @ 22:00)  RPR: Negative                          11.5   12.61<H> >-----------< 161    (  @ 06:25 )             35.1                  Physical exam:  Gen: NAD  Abdomen: Soft, nontender, no distension , firm uterine fundus at umbilicus, nontender  Incision: Pfannenstiel skin incision Clean, dry, and intact   Ext: No calf tenderness or edema

## 2017-12-13 NOTE — DISCHARGE NOTE OB - CARE PLAN
Principal Discharge DX:	 delivery delivered  Goal:	Return to usual daily living activities  Instructions for follow-up, activity and diet:	Follow up in the office in 2 weeks for incision check. Call [phone] for an appointment.   Regular Diet.  Secondary Diagnosis:	 labor in second trimester with  delivery, single or unspecified fetus

## 2018-04-09 ENCOUNTER — OUTPATIENT (OUTPATIENT)
Dept: OUTPATIENT SERVICES | Facility: HOSPITAL | Age: 23
LOS: 1 days | End: 2018-04-09
Payer: MEDICAID

## 2018-04-09 ENCOUNTER — APPOINTMENT (OUTPATIENT)
Dept: OBGYN | Facility: CLINIC | Age: 23
End: 2018-04-09
Payer: MEDICAID

## 2018-04-09 ENCOUNTER — LABORATORY RESULT (OUTPATIENT)
Age: 23
End: 2018-04-09

## 2018-04-09 VITALS — SYSTOLIC BLOOD PRESSURE: 118 MMHG | WEIGHT: 157 LBS | DIASTOLIC BLOOD PRESSURE: 72 MMHG

## 2018-04-09 DIAGNOSIS — Z30.430 ENCOUNTER FOR INSERTION OF INTRAUTERINE CONTRACEPTIVE DEVICE: ICD-10-CM

## 2018-04-09 DIAGNOSIS — Z00.00 ENCOUNTER FOR GENERAL ADULT MEDICAL EXAMINATION W/OUT ABNORMAL FINDINGS: ICD-10-CM

## 2018-04-09 DIAGNOSIS — N76.0 ACUTE VAGINITIS: ICD-10-CM

## 2018-04-09 PROCEDURE — 99213 OFFICE O/P EST LOW 20 MIN: CPT | Mod: 25,GC

## 2018-04-09 PROCEDURE — 58300 INSERT INTRAUTERINE DEVICE: CPT | Mod: GC

## 2018-04-10 LAB
C TRACH RRNA SPEC QL NAA+PROBE: SIGNIFICANT CHANGE UP
N GONORRHOEA RRNA SPEC QL NAA+PROBE: SIGNIFICANT CHANGE UP
SPECIMEN SOURCE: SIGNIFICANT CHANGE UP

## 2018-04-10 PROCEDURE — 58300 INSERT INTRAUTERINE DEVICE: CPT

## 2018-04-10 PROCEDURE — G0463: CPT

## 2018-04-11 DIAGNOSIS — Z30.430 ENCOUNTER FOR INSERTION OF INTRAUTERINE CONTRACEPTIVE DEVICE: ICD-10-CM

## 2018-05-15 ENCOUNTER — APPOINTMENT (OUTPATIENT)
Dept: OBGYN | Facility: CLINIC | Age: 23
End: 2018-05-15

## 2018-06-06 ENCOUNTER — APPOINTMENT (OUTPATIENT)
Dept: OBGYN | Facility: CLINIC | Age: 23
End: 2018-06-06
Payer: MEDICAID

## 2018-06-06 ENCOUNTER — OUTPATIENT (OUTPATIENT)
Dept: OUTPATIENT SERVICES | Facility: HOSPITAL | Age: 23
LOS: 1 days | End: 2018-06-06
Payer: COMMERCIAL

## 2018-06-06 VITALS — WEIGHT: 161 LBS | DIASTOLIC BLOOD PRESSURE: 60 MMHG | SYSTOLIC BLOOD PRESSURE: 110 MMHG

## 2018-06-06 DIAGNOSIS — Z34.80 ENCOUNTER FOR SUPERVISION OF OTHER NORMAL PREGNANCY, UNSPECIFIED TRIMESTER: ICD-10-CM

## 2018-06-06 PROCEDURE — G0463: CPT

## 2018-06-06 PROCEDURE — 99213 OFFICE O/P EST LOW 20 MIN: CPT | Mod: GC

## 2018-06-15 DIAGNOSIS — Z97.5 PRESENCE OF (INTRAUTERINE) CONTRACEPTIVE DEVICE: ICD-10-CM

## 2019-11-11 ENCOUNTER — APPOINTMENT (OUTPATIENT)
Dept: OBGYN | Facility: CLINIC | Age: 24
End: 2019-11-11

## 2019-11-15 ENCOUNTER — APPOINTMENT (OUTPATIENT)
Dept: OBGYN | Facility: CLINIC | Age: 24
End: 2019-11-15

## 2019-11-21 ENCOUNTER — APPOINTMENT (OUTPATIENT)
Dept: OBGYN | Facility: CLINIC | Age: 24
End: 2019-11-21

## 2020-01-24 ENCOUNTER — APPOINTMENT (OUTPATIENT)
Dept: OBGYN | Facility: CLINIC | Age: 25
End: 2020-01-24

## 2020-03-10 ENCOUNTER — APPOINTMENT (OUTPATIENT)
Dept: ANTEPARTUM | Facility: CLINIC | Age: 25
End: 2020-03-10
Payer: MEDICAID

## 2020-03-10 ENCOUNTER — ASOB RESULT (OUTPATIENT)
Age: 25
End: 2020-03-10

## 2020-03-10 PROCEDURE — 36416 COLLJ CAPILLARY BLOOD SPEC: CPT

## 2020-03-10 PROCEDURE — 76813 OB US NUCHAL MEAS 1 GEST: CPT

## 2020-03-10 PROCEDURE — 76801 OB US < 14 WKS SINGLE FETUS: CPT

## 2020-03-17 ENCOUNTER — APPOINTMENT (OUTPATIENT)
Dept: ANTEPARTUM | Facility: CLINIC | Age: 25
End: 2020-03-17

## 2020-05-05 ENCOUNTER — ASOB RESULT (OUTPATIENT)
Age: 25
End: 2020-05-05

## 2020-05-05 ENCOUNTER — APPOINTMENT (OUTPATIENT)
Dept: ANTEPARTUM | Facility: CLINIC | Age: 25
End: 2020-05-05
Payer: MEDICAID

## 2020-05-05 PROCEDURE — 76817 TRANSVAGINAL US OBSTETRIC: CPT

## 2020-05-05 PROCEDURE — 76811 OB US DETAILED SNGL FETUS: CPT

## 2020-06-04 ENCOUNTER — APPOINTMENT (OUTPATIENT)
Dept: ANTEPARTUM | Facility: CLINIC | Age: 25
End: 2020-06-04
Payer: MEDICAID

## 2020-06-04 ENCOUNTER — ASOB RESULT (OUTPATIENT)
Age: 25
End: 2020-06-04

## 2020-06-04 PROCEDURE — 99204 OFFICE O/P NEW MOD 45 MIN: CPT | Mod: 95

## 2020-06-05 ENCOUNTER — APPOINTMENT (OUTPATIENT)
Dept: ANTEPARTUM | Facility: CLINIC | Age: 25
End: 2020-06-05
Payer: MEDICAID

## 2020-06-05 ENCOUNTER — ASOB RESULT (OUTPATIENT)
Age: 25
End: 2020-06-05

## 2020-06-05 PROCEDURE — 76817 TRANSVAGINAL US OBSTETRIC: CPT

## 2020-06-05 PROCEDURE — 76815 OB US LIMITED FETUS(S): CPT

## 2020-08-09 ENCOUNTER — EMERGENCY (EMERGENCY)
Facility: HOSPITAL | Age: 25
LOS: 1 days | Discharge: STILL ADMITTED OR ADMINISTRATIVE | End: 2020-08-09
Admitting: EMERGENCY MEDICINE

## 2020-08-09 ENCOUNTER — OUTPATIENT (OUTPATIENT)
Dept: INPATIENT UNIT | Facility: HOSPITAL | Age: 25
LOS: 1 days | Discharge: HOME | End: 2020-08-09
Payer: MEDICAID

## 2020-08-09 VITALS
TEMPERATURE: 98 F | RESPIRATION RATE: 18 BRPM | OXYGEN SATURATION: 100 % | DIASTOLIC BLOOD PRESSURE: 83 MMHG | HEART RATE: 86 BPM | SYSTOLIC BLOOD PRESSURE: 134 MMHG

## 2020-08-09 VITALS
DIASTOLIC BLOOD PRESSURE: 73 MMHG | RESPIRATION RATE: 16 BRPM | HEART RATE: 96 BPM | TEMPERATURE: 98 F | SYSTOLIC BLOOD PRESSURE: 126 MMHG

## 2020-08-09 DIAGNOSIS — Z3A.00 WEEKS OF GESTATION OF PREGNANCY NOT SPECIFIED: ICD-10-CM

## 2020-08-09 DIAGNOSIS — O26.899 OTHER SPECIFIED PREGNANCY RELATED CONDITIONS, UNSPECIFIED TRIMESTER: ICD-10-CM

## 2020-08-09 LAB
APPEARANCE UR: SIGNIFICANT CHANGE UP
BACTERIA # UR AUTO: SIGNIFICANT CHANGE UP
BILIRUB UR-MCNC: NEGATIVE — SIGNIFICANT CHANGE UP
BLOOD UR QL VISUAL: NEGATIVE — SIGNIFICANT CHANGE UP
COLOR SPEC: SIGNIFICANT CHANGE UP
GLUCOSE UR-MCNC: NEGATIVE — SIGNIFICANT CHANGE UP
HYALINE CASTS # UR AUTO: NEGATIVE — SIGNIFICANT CHANGE UP
KETONES UR-MCNC: NEGATIVE — SIGNIFICANT CHANGE UP
LEUKOCYTE ESTERASE UR-ACNC: NEGATIVE — SIGNIFICANT CHANGE UP
NITRITE UR-MCNC: NEGATIVE — SIGNIFICANT CHANGE UP
PH UR: 7 — SIGNIFICANT CHANGE UP (ref 5–8)
PROT UR-MCNC: NEGATIVE — SIGNIFICANT CHANGE UP
RBC CASTS # UR COMP ASSIST: SIGNIFICANT CHANGE UP (ref 0–?)
SP GR SPEC: 1.01 — SIGNIFICANT CHANGE UP (ref 1–1.04)
SQUAMOUS # UR AUTO: SIGNIFICANT CHANGE UP
UROBILINOGEN FLD QL: NORMAL — SIGNIFICANT CHANGE UP
WBC UR QL: SIGNIFICANT CHANGE UP (ref 0–?)

## 2020-08-09 PROCEDURE — 59025 FETAL NON-STRESS TEST: CPT | Mod: 26

## 2020-08-09 PROCEDURE — 99214 OFFICE O/P EST MOD 30 MIN: CPT

## 2020-08-09 PROCEDURE — 76818 FETAL BIOPHYS PROFILE W/NST: CPT | Mod: 26

## 2020-08-09 RX ORDER — SODIUM CHLORIDE 9 MG/ML
1000 INJECTION, SOLUTION INTRAVENOUS
Refills: 0 | Status: DISCONTINUED | OUTPATIENT
Start: 2020-08-09 | End: 2020-08-24

## 2020-08-09 RX ORDER — SODIUM CHLORIDE 9 MG/ML
1000 INJECTION, SOLUTION INTRAVENOUS ONCE
Refills: 0 | Status: COMPLETED | OUTPATIENT
Start: 2020-08-09 | End: 2020-08-09

## 2020-08-09 RX ADMIN — SODIUM CHLORIDE 125 MILLILITER(S): 9 INJECTION, SOLUTION INTRAVENOUS at 21:59

## 2020-08-09 RX ADMIN — SODIUM CHLORIDE 1000 MILLILITER(S): 9 INJECTION, SOLUTION INTRAVENOUS at 20:20

## 2020-08-09 NOTE — OB PROVIDER TRIAGE NOTE - ADDITIONAL INSTRUCTIONS
Plan for a dose of betamethasone now and pt. to return to triage 24hrs from first dose for second dose. Pt. d/c'd home. Pt. to follow up with OB on Wednesday. Pt. instructed to return to triage with increase abdominal contractions, leakage of fluid and vaginal bleeding. Increase PO hydration encouraged. Pt. d/c'd home. Pt. to follow up with OB on Wednesday (8/12/2020). Pt. instructed to return to triage with increase abdominal contractions, leakage of fluid and vaginal bleeding. Increase PO hydration encouraged.

## 2020-08-09 NOTE — OB PROVIDER TRIAGE NOTE - NSHPPHYSICALEXAM_GEN_ALL_CORE
BP: 126/73, HR: 96, Temp: 36.8  Abdomen soft nontender  SVE: 0.5/50/-3 BP: 126/73, HR: 96, Temp: 36.8  Abdomen soft nontender  SVE: 0.5/50/-3  TAS: Cephalic presentation, anterior placenta, DARYA: 9.53, EFW: 2147gms, BPP:8/8

## 2020-08-09 NOTE — OB PROVIDER TRIAGE NOTE - HISTORY OF PRESENT ILLNESS
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 Pt. states she had had intercourse around 18:00 and around 18:30 started having abdominal cramping pain was initially 10/10 then went down to 7/10 and currently is 5/10. PNC with previous  at 25wks for  labor. Pt. states she was on Karo but declined after two doses. Pt. is scheduled for repeat  2020. At this time pt. declining Tylenol.

## 2020-08-09 NOTE — OB PROVIDER TRIAGE NOTE - NSOBPROVIDERNOTE_OBGYN_ALL_OB_FT
@6576 pt. declined second liter of fluid. Pt. states she no longer feels pain and would like to go home. @2130 pt. declined second liter of fluid. Pt. states she no longer feels pain and would like to go home.  @2145 pt. agrees to receive second liter of fluid and to stay to be re-examined.   @2215 SVE unchanged.    Discussed findings with Dr. Lynn. Plan for a dose of betamethasone now and pt. to return to triage 24hrs from first dose for second dose. Pt. d/c'd home. Pt. to follow up with OB on Wednesday. Pt. instructed to return to triage with increase abdominal contractions, leakage of fluid and vaginal bleeding. Increase PO hydration encouraged. @2130 pt. declined second liter of fluid. Pt. states she no longer feels pain and would like to go home.  @2145 pt. agrees to receive second liter of fluid and to stay to be re-examined.   @2215 SVE unchanged.  Discussed findings with Dr. Lynn. Plan for a dose of betamethasone    Pt. refusing betamethasone. Dr. Lynn made aware. Pt. d/c'd home. Pt. to follow up with OB on Wednesday (8/12/2020). Pt. instructed to return to triage with increase abdominal contractions, leakage of fluid and vaginal bleeding. Increase PO hydration encouraged.

## 2020-08-09 NOTE — OB PROVIDER TRIAGE NOTE - NSHPLABSRESULTS_GEN_ALL_CORE
Urinalysis Basic - ( 09 Aug 2020 20:15 )    Color: LIGHT YELLOW / Appearance: Lt TURBID / S.010 / pH: 7.0  Gluc: NEGATIVE / Ketone: NEGATIVE  / Bili: NEGATIVE / Urobili: NORMAL   Blood: NEGATIVE / Protein: NEGATIVE / Nitrite: NEGATIVE   Leuk Esterase: NEGATIVE / RBC: 0-2 / WBC 0-2   Sq Epi: OCC / Non Sq Epi: x / Bacteria: FEW

## 2020-08-09 NOTE — OB RN TRIAGE NOTE - PSH
delivery delivered  c section  at 25 weeks gestation male  delivery delivered   at 25 weeks gestation male PTL footling breech

## 2020-08-11 ENCOUNTER — APPOINTMENT (OUTPATIENT)
Dept: ANTEPARTUM | Facility: CLINIC | Age: 25
End: 2020-08-11
Payer: MEDICAID

## 2020-08-11 ENCOUNTER — ASOB RESULT (OUTPATIENT)
Age: 25
End: 2020-08-11

## 2020-08-11 PROCEDURE — 76816 OB US FOLLOW-UP PER FETUS: CPT

## 2020-08-11 PROCEDURE — 76819 FETAL BIOPHYS PROFIL W/O NST: CPT

## 2020-08-15 ENCOUNTER — TRANSCRIPTION ENCOUNTER (OUTPATIENT)
Age: 25
End: 2020-08-15

## 2020-08-16 ENCOUNTER — EMERGENCY (EMERGENCY)
Facility: HOSPITAL | Age: 25
LOS: 1 days | Discharge: STILL ADMITTED OR ADMINISTRATIVE | End: 2020-08-16
Attending: EMERGENCY MEDICINE | Admitting: EMERGENCY MEDICINE
Payer: MEDICAID

## 2020-08-16 ENCOUNTER — RESULT REVIEW (OUTPATIENT)
Age: 25
End: 2020-08-16

## 2020-08-16 ENCOUNTER — INPATIENT (INPATIENT)
Facility: HOSPITAL | Age: 25
LOS: 5 days | Discharge: HOME | End: 2020-08-22
Attending: SPECIALIST | Admitting: SPECIALIST
Payer: MEDICAID

## 2020-08-16 VITALS
OXYGEN SATURATION: 100 % | RESPIRATION RATE: 20 BRPM | HEART RATE: 100 BPM | SYSTOLIC BLOOD PRESSURE: 100 MMHG | DIASTOLIC BLOOD PRESSURE: 61 MMHG

## 2020-08-16 VITALS
SYSTOLIC BLOOD PRESSURE: 88 MMHG | OXYGEN SATURATION: 99 % | DIASTOLIC BLOOD PRESSURE: 51 MMHG | HEART RATE: 85 BPM | TEMPERATURE: 98 F | RESPIRATION RATE: 18 BRPM

## 2020-08-16 VITALS — DIASTOLIC BLOOD PRESSURE: 66 MMHG | SYSTOLIC BLOOD PRESSURE: 111 MMHG | HEART RATE: 107 BPM

## 2020-08-16 VITALS — OXYGEN SATURATION: 100 % | HEART RATE: 115 BPM

## 2020-08-16 DIAGNOSIS — R10.9 UNSPECIFIED ABDOMINAL PAIN: ICD-10-CM

## 2020-08-16 DIAGNOSIS — O34.211 MATERNAL CARE FOR LOW TRANSVERSE SCAR FROM PREVIOUS CESAREAN DELIVERY: ICD-10-CM

## 2020-08-16 DIAGNOSIS — Z3A.00 WEEKS OF GESTATION OF PREGNANCY NOT SPECIFIED: ICD-10-CM

## 2020-08-16 DIAGNOSIS — O26.899 OTHER SPECIFIED PREGNANCY RELATED CONDITIONS, UNSPECIFIED TRIMESTER: ICD-10-CM

## 2020-08-16 PROBLEM — Z78.9 OTHER SPECIFIED HEALTH STATUS: Chronic | Status: ACTIVE | Noted: 2020-08-09

## 2020-08-16 LAB
ALBUMIN SERPL ELPH-MCNC: 3.1 G/DL — LOW (ref 3.3–5)
ALBUMIN SERPL ELPH-MCNC: 3.5 G/DL — SIGNIFICANT CHANGE UP (ref 3.3–5)
ALP SERPL-CCNC: 119 U/L — SIGNIFICANT CHANGE UP (ref 40–120)
ALP SERPL-CCNC: 155 U/L — HIGH (ref 40–120)
ALT FLD-CCNC: 10 U/L — SIGNIFICANT CHANGE UP (ref 4–33)
ALT FLD-CCNC: 14 U/L — SIGNIFICANT CHANGE UP (ref 4–33)
AMYLASE P1 CFR SERPL: 82 U/L — SIGNIFICANT CHANGE UP (ref 25–125)
ANION GAP SERPL CALC-SCNC: 12 MMO/L — SIGNIFICANT CHANGE UP (ref 7–14)
ANION GAP SERPL CALC-SCNC: 15 MMO/L — HIGH (ref 7–14)
ANISOCYTOSIS BLD QL: SLIGHT — SIGNIFICANT CHANGE UP
APTT BLD: 22.5 SEC — LOW (ref 27–36.3)
APTT BLD: 26.1 SEC — LOW (ref 27–36.3)
APTT BLD: 28.3 SEC — SIGNIFICANT CHANGE UP (ref 27–36.3)
AST SERPL-CCNC: 20 U/L — SIGNIFICANT CHANGE UP (ref 4–32)
AST SERPL-CCNC: 28 U/L — SIGNIFICANT CHANGE UP (ref 4–32)
BASE EXCESS BLDA CALC-SCNC: -3.9 MMOL/L — SIGNIFICANT CHANGE UP
BASE EXCESS BLDV CALC-SCNC: -1.1 MMOL/L — SIGNIFICANT CHANGE UP
BASOPHILS # BLD AUTO: 0.05 K/UL — SIGNIFICANT CHANGE UP (ref 0–0.2)
BASOPHILS NFR BLD AUTO: 0.3 % — SIGNIFICANT CHANGE UP (ref 0–2)
BASOPHILS NFR SPEC: 0 % — SIGNIFICANT CHANGE UP (ref 0–2)
BILIRUB SERPL-MCNC: 0.4 MG/DL — SIGNIFICANT CHANGE UP (ref 0.2–1.2)
BILIRUB SERPL-MCNC: < 0.2 MG/DL — LOW (ref 0.2–1.2)
BLASTS # FLD: 0 % — SIGNIFICANT CHANGE UP (ref 0–0)
BLD GP AB SCN SERPL QL: NEGATIVE — SIGNIFICANT CHANGE UP
BLOOD GAS VENOUS - CREATININE: 0.52 MG/DL — SIGNIFICANT CHANGE UP (ref 0.5–1.3)
BLOOD GAS VENOUS - FIO2: 21 — SIGNIFICANT CHANGE UP
BUN SERPL-MCNC: 6 MG/DL — LOW (ref 7–23)
BUN SERPL-MCNC: 7 MG/DL — SIGNIFICANT CHANGE UP (ref 7–23)
CALCIUM SERPL-MCNC: 8.6 MG/DL — SIGNIFICANT CHANGE UP (ref 8.4–10.5)
CALCIUM SERPL-MCNC: 9.5 MG/DL — SIGNIFICANT CHANGE UP (ref 8.4–10.5)
CHLORIDE BLDA-SCNC: 106 MMOL/L — SIGNIFICANT CHANGE UP (ref 96–108)
CHLORIDE BLDV-SCNC: 103 MMOL/L — SIGNIFICANT CHANGE UP (ref 96–108)
CHLORIDE SERPL-SCNC: 101 MMOL/L — SIGNIFICANT CHANGE UP (ref 98–107)
CHLORIDE SERPL-SCNC: 106 MMOL/L — SIGNIFICANT CHANGE UP (ref 98–107)
CO2 SERPL-SCNC: 19 MMOL/L — LOW (ref 22–31)
CO2 SERPL-SCNC: 20 MMOL/L — LOW (ref 22–31)
CREAT SERPL-MCNC: 0.34 MG/DL — LOW (ref 0.5–1.3)
CREAT SERPL-MCNC: 0.47 MG/DL — LOW (ref 0.5–1.3)
EOSINOPHIL # BLD AUTO: 0.08 K/UL — SIGNIFICANT CHANGE UP (ref 0–0.5)
EOSINOPHIL NFR BLD AUTO: 0.4 % — SIGNIFICANT CHANGE UP (ref 0–6)
EOSINOPHIL NFR FLD: 0 % — SIGNIFICANT CHANGE UP (ref 0–6)
GAS PNL BLDV: 139 MMOL/L — SIGNIFICANT CHANGE UP (ref 136–146)
GIANT PLATELETS BLD QL SMEAR: PRESENT — SIGNIFICANT CHANGE UP
GLUCOSE BLDA-MCNC: 127 MG/DL — HIGH (ref 70–99)
GLUCOSE BLDV-MCNC: 129 MG/DL — HIGH (ref 70–99)
GLUCOSE SERPL-MCNC: 126 MG/DL — HIGH (ref 70–99)
GLUCOSE SERPL-MCNC: 133 MG/DL — HIGH (ref 70–99)
HCO3 BLDA-SCNC: 21 MMOL/L — LOW (ref 22–26)
HCO3 BLDV-SCNC: 21 MMOL/L — SIGNIFICANT CHANGE UP (ref 20–27)
HCT VFR BLD CALC: 34.3 % — LOW (ref 34.5–45)
HCT VFR BLD CALC: 37.1 % — SIGNIFICANT CHANGE UP (ref 34.5–45)
HCT VFR BLD CALC: 38.6 % — SIGNIFICANT CHANGE UP (ref 34.5–45)
HCT VFR BLDA CALC: 40.1 % — SIGNIFICANT CHANGE UP (ref 34.5–46.5)
HCT VFR BLDV CALC: 39.4 % — SIGNIFICANT CHANGE UP (ref 34.5–45)
HGB BLD-MCNC: 11.7 G/DL — SIGNIFICANT CHANGE UP (ref 11.5–15.5)
HGB BLD-MCNC: 12.2 G/DL — SIGNIFICANT CHANGE UP (ref 11.5–15.5)
HGB BLD-MCNC: 12.3 G/DL — SIGNIFICANT CHANGE UP (ref 11.5–15.5)
HGB BLDA-MCNC: 13.1 G/DL — SIGNIFICANT CHANGE UP (ref 11.5–15.5)
HGB BLDV-MCNC: 12.8 G/DL — SIGNIFICANT CHANGE UP (ref 11.5–15.5)
HIV COMBO RESULT: SIGNIFICANT CHANGE UP
HIV1+2 AB SPEC QL: SIGNIFICANT CHANGE UP
IMM GRANULOCYTES NFR BLD AUTO: 1.6 % — HIGH (ref 0–1.5)
INR BLD: 1.09 — SIGNIFICANT CHANGE UP (ref 0.88–1.16)
INR BLD: 1.1 — SIGNIFICANT CHANGE UP (ref 0.88–1.16)
INR BLD: 1.11 — SIGNIFICANT CHANGE UP (ref 0.88–1.16)
LACTATE BLDA-SCNC: 2.7 MMOL/L — HIGH (ref 0.5–2)
LACTATE BLDV-MCNC: 3 MMOL/L — HIGH (ref 0.5–2)
LIDOCAIN IGE QN: 29.2 U/L — SIGNIFICANT CHANGE UP (ref 7–60)
LYMPHOCYTES # BLD AUTO: 17.5 % — SIGNIFICANT CHANGE UP (ref 13–44)
LYMPHOCYTES # BLD AUTO: 3.12 K/UL — SIGNIFICANT CHANGE UP (ref 1–3.3)
LYMPHOCYTES NFR SPEC AUTO: 1.7 % — LOW (ref 13–44)
MAGNESIUM SERPL-MCNC: 1.5 MG/DL — LOW (ref 1.6–2.6)
MCHC RBC-ENTMCNC: 26.5 PG — LOW (ref 27–34)
MCHC RBC-ENTMCNC: 27.4 PG — SIGNIFICANT CHANGE UP (ref 27–34)
MCHC RBC-ENTMCNC: 27.7 PG — SIGNIFICANT CHANGE UP (ref 27–34)
MCHC RBC-ENTMCNC: 31.6 % — LOW (ref 32–36)
MCHC RBC-ENTMCNC: 33.2 % — SIGNIFICANT CHANGE UP (ref 32–36)
MCHC RBC-ENTMCNC: 34.1 % — SIGNIFICANT CHANGE UP (ref 32–36)
MCV RBC AUTO: 81.3 FL — SIGNIFICANT CHANGE UP (ref 80–100)
MCV RBC AUTO: 82.6 FL — SIGNIFICANT CHANGE UP (ref 80–100)
MCV RBC AUTO: 83.7 FL — SIGNIFICANT CHANGE UP (ref 80–100)
METAMYELOCYTES # FLD: 0 % — SIGNIFICANT CHANGE UP (ref 0–1)
MONOCYTES # BLD AUTO: 1.02 K/UL — HIGH (ref 0–0.9)
MONOCYTES NFR BLD AUTO: 5.7 % — SIGNIFICANT CHANGE UP (ref 2–14)
MONOCYTES NFR BLD: 4.3 % — SIGNIFICANT CHANGE UP (ref 2–9)
MYELOCYTES NFR BLD: 0 % — SIGNIFICANT CHANGE UP (ref 0–0)
NEUTROPHIL AB SER-ACNC: 89.6 % — HIGH (ref 43–77)
NEUTROPHILS # BLD AUTO: 13.23 K/UL — HIGH (ref 1.8–7.4)
NEUTROPHILS NFR BLD AUTO: 74.5 % — SIGNIFICANT CHANGE UP (ref 43–77)
NEUTS BAND # BLD: 3.5 % — SIGNIFICANT CHANGE UP (ref 0–6)
NRBC # FLD: 0 K/UL — SIGNIFICANT CHANGE UP (ref 0–0)
OTHER - HEMATOLOGY %: 0 — SIGNIFICANT CHANGE UP
PCO2 BLDA: 39 MMHG — SIGNIFICANT CHANGE UP (ref 32–48)
PCO2 BLDV: 53 MMHG — HIGH (ref 41–51)
PH BLDA: 7.35 PH — SIGNIFICANT CHANGE UP (ref 7.35–7.45)
PH BLDV: 7.29 PH — LOW (ref 7.32–7.43)
PHOSPHATE SERPL-MCNC: 2.7 MG/DL — SIGNIFICANT CHANGE UP (ref 2.5–4.5)
PLATELET # BLD AUTO: 110 K/UL — LOW (ref 150–400)
PLATELET # BLD AUTO: 120 K/UL — LOW (ref 150–400)
PLATELET # BLD AUTO: 173 K/UL — SIGNIFICANT CHANGE UP (ref 150–400)
PLATELET COUNT - ESTIMATE: SIGNIFICANT CHANGE UP
PMV BLD: 13.7 FL — HIGH (ref 7–13)
PMV BLD: SIGNIFICANT CHANGE UP FL (ref 7–13)
PMV BLD: SIGNIFICANT CHANGE UP FL (ref 7–13)
PO2 BLDA: 99 MMHG — SIGNIFICANT CHANGE UP (ref 83–108)
PO2 BLDV: 25 MMHG — LOW (ref 35–40)
POIKILOCYTOSIS BLD QL AUTO: SLIGHT — SIGNIFICANT CHANGE UP
POTASSIUM BLDA-SCNC: 3.7 MMOL/L — SIGNIFICANT CHANGE UP (ref 3.4–4.5)
POTASSIUM BLDV-SCNC: 3.6 MMOL/L — SIGNIFICANT CHANGE UP (ref 3.4–4.5)
POTASSIUM SERPL-MCNC: 4 MMOL/L — SIGNIFICANT CHANGE UP (ref 3.5–5.3)
POTASSIUM SERPL-MCNC: 4.1 MMOL/L — SIGNIFICANT CHANGE UP (ref 3.5–5.3)
POTASSIUM SERPL-SCNC: 4 MMOL/L — SIGNIFICANT CHANGE UP (ref 3.5–5.3)
POTASSIUM SERPL-SCNC: 4.1 MMOL/L — SIGNIFICANT CHANGE UP (ref 3.5–5.3)
PROMYELOCYTES # FLD: 0 % — SIGNIFICANT CHANGE UP (ref 0–0)
PROT SERPL-MCNC: 5.4 G/DL — LOW (ref 6–8.3)
PROT SERPL-MCNC: 6.7 G/DL — SIGNIFICANT CHANGE UP (ref 6–8.3)
PROTHROM AB SERPL-ACNC: 12.4 SEC — SIGNIFICANT CHANGE UP (ref 10.6–13.6)
PROTHROM AB SERPL-ACNC: 12.5 SEC — SIGNIFICANT CHANGE UP (ref 10.6–13.6)
PROTHROM AB SERPL-ACNC: 12.6 SEC — SIGNIFICANT CHANGE UP (ref 10.6–13.6)
RBC # BLD: 4.22 M/UL — SIGNIFICANT CHANGE UP (ref 3.8–5.2)
RBC # BLD: 4.49 M/UL — SIGNIFICANT CHANGE UP (ref 3.8–5.2)
RBC # BLD: 4.61 M/UL — SIGNIFICANT CHANGE UP (ref 3.8–5.2)
RBC # FLD: 15.4 % — HIGH (ref 10.3–14.5)
RBC # FLD: 15.7 % — HIGH (ref 10.3–14.5)
RBC # FLD: 15.8 % — HIGH (ref 10.3–14.5)
RH IG SCN BLD-IMP: POSITIVE — SIGNIFICANT CHANGE UP
SAO2 % BLDA: 97.7 % — SIGNIFICANT CHANGE UP (ref 95–99)
SAO2 % BLDV: 33.3 % — LOW (ref 60–85)
SARS-COV-2 RNA SPEC QL NAA+PROBE: SIGNIFICANT CHANGE UP
SODIUM BLDA-SCNC: 135 MMOL/L — LOW (ref 136–146)
SODIUM SERPL-SCNC: 136 MMOL/L — SIGNIFICANT CHANGE UP (ref 135–145)
SODIUM SERPL-SCNC: 137 MMOL/L — SIGNIFICANT CHANGE UP (ref 135–145)
TROPONIN T, HIGH SENSITIVITY: 11 NG/L — SIGNIFICANT CHANGE UP (ref ?–14)
TROPONIN T, HIGH SENSITIVITY: 23 NG/L — SIGNIFICANT CHANGE UP (ref ?–14)
VARIANT LYMPHS # BLD: 0.9 % — SIGNIFICANT CHANGE UP
WBC # BLD: 16.37 K/UL — HIGH (ref 3.8–10.5)
WBC # BLD: 17.78 K/UL — HIGH (ref 3.8–10.5)
WBC # BLD: 19.98 K/UL — HIGH (ref 3.8–10.5)
WBC # FLD AUTO: 16.37 K/UL — HIGH (ref 3.8–10.5)
WBC # FLD AUTO: 17.78 K/UL — HIGH (ref 3.8–10.5)
WBC # FLD AUTO: 19.98 K/UL — HIGH (ref 3.8–10.5)

## 2020-08-16 PROCEDURE — 99291 CRITICAL CARE FIRST HOUR: CPT

## 2020-08-16 PROCEDURE — 99222 1ST HOSP IP/OBS MODERATE 55: CPT

## 2020-08-16 PROCEDURE — 93010 ELECTROCARDIOGRAM REPORT: CPT

## 2020-08-16 PROCEDURE — 88307 TISSUE EXAM BY PATHOLOGIST: CPT | Mod: 26

## 2020-08-16 PROCEDURE — 71045 X-RAY EXAM CHEST 1 VIEW: CPT | Mod: 26

## 2020-08-16 RX ORDER — LANOLIN
1 OINTMENT (GRAM) TOPICAL EVERY 6 HOURS
Refills: 0 | Status: DISCONTINUED | OUTPATIENT
Start: 2020-08-16 | End: 2020-08-22

## 2020-08-16 RX ORDER — OXYCODONE HYDROCHLORIDE 5 MG/1
5 TABLET ORAL ONCE
Refills: 0 | Status: DISCONTINUED | OUTPATIENT
Start: 2020-08-16 | End: 2020-08-17

## 2020-08-16 RX ORDER — HEPARIN SODIUM 5000 [USP'U]/ML
5000 INJECTION INTRAVENOUS; SUBCUTANEOUS EVERY 8 HOURS
Refills: 0 | Status: DISCONTINUED | OUTPATIENT
Start: 2020-08-16 | End: 2020-08-17

## 2020-08-16 RX ORDER — SODIUM CHLORIDE 9 MG/ML
1000 INJECTION, SOLUTION INTRAVENOUS
Refills: 0 | Status: DISCONTINUED | OUTPATIENT
Start: 2020-08-16 | End: 2020-08-17

## 2020-08-16 RX ORDER — OXYCODONE HYDROCHLORIDE 5 MG/1
5 TABLET ORAL
Refills: 0 | Status: COMPLETED | OUTPATIENT
Start: 2020-08-16 | End: 2020-08-23

## 2020-08-16 RX ORDER — DIPHENHYDRAMINE HCL 50 MG
25 CAPSULE ORAL EVERY 6 HOURS
Refills: 0 | Status: DISCONTINUED | OUTPATIENT
Start: 2020-08-16 | End: 2020-08-22

## 2020-08-16 RX ORDER — ACETAMINOPHEN 500 MG
975 TABLET ORAL EVERY 6 HOURS
Refills: 0 | Status: COMPLETED | OUTPATIENT
Start: 2020-08-16 | End: 2021-07-15

## 2020-08-16 RX ORDER — OXYCODONE HYDROCHLORIDE 5 MG/1
5 TABLET ORAL
Refills: 0 | Status: DISCONTINUED | OUTPATIENT
Start: 2020-08-16 | End: 2020-08-17

## 2020-08-16 RX ORDER — MAGNESIUM HYDROXIDE 400 MG/1
30 TABLET, CHEWABLE ORAL
Refills: 0 | Status: DISCONTINUED | OUTPATIENT
Start: 2020-08-16 | End: 2020-08-17

## 2020-08-16 RX ORDER — ACETAMINOPHEN 500 MG
975 TABLET ORAL ONCE
Refills: 0 | Status: COMPLETED | OUTPATIENT
Start: 2020-08-16 | End: 2020-08-16

## 2020-08-16 RX ORDER — ACETAMINOPHEN 500 MG
1000 TABLET ORAL EVERY 6 HOURS
Refills: 0 | Status: COMPLETED | OUTPATIENT
Start: 2020-08-16 | End: 2020-08-17

## 2020-08-16 RX ORDER — SIMETHICONE 80 MG/1
80 TABLET, CHEWABLE ORAL EVERY 4 HOURS
Refills: 0 | Status: DISCONTINUED | OUTPATIENT
Start: 2020-08-16 | End: 2020-08-17

## 2020-08-16 RX ORDER — MAGNESIUM SULFATE 500 MG/ML
2 VIAL (ML) INJECTION ONCE
Refills: 0 | Status: COMPLETED | OUTPATIENT
Start: 2020-08-16 | End: 2020-08-16

## 2020-08-16 RX ORDER — OXYTOCIN 10 UNIT/ML
333.33 VIAL (ML) INJECTION
Qty: 20 | Refills: 0 | Status: DISCONTINUED | OUTPATIENT
Start: 2020-08-16 | End: 2020-08-17

## 2020-08-16 RX ORDER — ONDANSETRON 8 MG/1
4 TABLET, FILM COATED ORAL ONCE
Refills: 0 | Status: COMPLETED | OUTPATIENT
Start: 2020-08-16 | End: 2020-08-16

## 2020-08-16 RX ORDER — SODIUM CHLORIDE 9 MG/ML
1000 INJECTION INTRAMUSCULAR; INTRAVENOUS; SUBCUTANEOUS ONCE
Refills: 0 | Status: COMPLETED | OUTPATIENT
Start: 2020-08-16 | End: 2020-08-16

## 2020-08-16 RX ORDER — DIPHENHYDRAMINE HCL 50 MG
25 CAPSULE ORAL ONCE
Refills: 0 | Status: COMPLETED | OUTPATIENT
Start: 2020-08-16 | End: 2020-08-16

## 2020-08-16 RX ORDER — PHENYLEPHRINE HYDROCHLORIDE 10 MG/ML
0.4 INJECTION INTRAVENOUS
Qty: 160 | Refills: 0 | Status: DISCONTINUED | OUTPATIENT
Start: 2020-08-16 | End: 2020-08-16

## 2020-08-16 RX ORDER — PHENYLEPHRINE HYDROCHLORIDE 10 MG/ML
0.4 INJECTION INTRAVENOUS
Qty: 160 | Refills: 0 | Status: DISCONTINUED | OUTPATIENT
Start: 2020-08-16 | End: 2020-08-17

## 2020-08-16 RX ORDER — TETANUS TOXOID, REDUCED DIPHTHERIA TOXOID AND ACELLULAR PERTUSSIS VACCINE, ADSORBED 5; 2.5; 8; 8; 2.5 [IU]/.5ML; [IU]/.5ML; UG/.5ML; UG/.5ML; UG/.5ML
0.5 SUSPENSION INTRAMUSCULAR ONCE
Refills: 0 | Status: DISCONTINUED | OUTPATIENT
Start: 2020-08-16 | End: 2020-08-22

## 2020-08-16 RX ORDER — SODIUM CHLORIDE 9 MG/ML
1000 INJECTION, SOLUTION INTRAVENOUS ONCE
Refills: 0 | Status: DISCONTINUED | OUTPATIENT
Start: 2020-08-16 | End: 2020-08-16

## 2020-08-16 RX ADMIN — SODIUM CHLORIDE 125 MILLILITER(S): 9 INJECTION, SOLUTION INTRAVENOUS at 21:51

## 2020-08-16 RX ADMIN — Medication 25 MILLIGRAM(S): at 18:34

## 2020-08-16 RX ADMIN — Medication 0.2 MILLIGRAM(S): at 21:51

## 2020-08-16 RX ADMIN — Medication 25 MILLIGRAM(S): at 08:32

## 2020-08-16 RX ADMIN — Medication 400 MILLIGRAM(S): at 18:35

## 2020-08-16 RX ADMIN — Medication 400 MILLIGRAM(S): at 08:44

## 2020-08-16 RX ADMIN — Medication 25 MILLIGRAM(S): at 22:09

## 2020-08-16 RX ADMIN — Medication 50 GRAM(S): at 08:55

## 2020-08-16 RX ADMIN — Medication 0.2 MILLIGRAM(S): at 15:48

## 2020-08-16 RX ADMIN — SODIUM CHLORIDE 1000 MILLILITER(S): 9 INJECTION INTRAMUSCULAR; INTRAVENOUS; SUBCUTANEOUS at 04:18

## 2020-08-16 RX ADMIN — Medication 0.2 MILLIGRAM(S): at 10:32

## 2020-08-16 RX ADMIN — ONDANSETRON 4 MILLIGRAM(S): 8 TABLET, FILM COATED ORAL at 04:17

## 2020-08-16 RX ADMIN — Medication 0.2 MILLIGRAM(S): at 18:34

## 2020-08-16 RX ADMIN — Medication 1000 MILLIGRAM(S): at 09:15

## 2020-08-16 RX ADMIN — Medication 63.75 MILLIMOLE(S): at 10:32

## 2020-08-16 RX ADMIN — Medication 1000 MILLIGRAM(S): at 18:36

## 2020-08-16 NOTE — CONSULT NOTE ADULT - ATTENDING COMMENTS
Critical Care Dx    S37.69XA Uterine perforation, initial encounter   -s/p ex lap, delivery, hysterectomy, EBL 3.5L, well resuscitated   R57.8 Hemorrhagic shock   -lactate elevated but hemodynamically stable, perfusing well  R07.1 Chest pain on breathing   -CXR negative, saturation well, unlabored breathing. EKG normal, troponins not needed right now. No left arm pain, no pain in jaw. BP stable     The patient is a critical care patient with life post-hemorrhagic threatening hemodynamic instability in SICU.  I have personally interviewed and examined the patient, reviewed data and laboratory tests/x-rays and all pertinent electronic images.  The SICU team has a constant risk benefit analyzes discussion with the primary team, all consultants, House Staff and PA's on all decisions.   Time involved in performance of separately billable procedures was not counted toward my critical care time. There is no overlap.    I have personally provided 60 minutes ([id number]) of critical care time concurrently with the resident/fellow. This time excludes time spent on separate procedures and time spent teaching. I have reviewed the resident's/fellow's documentation and agree with the assessment and plan of care.  I was physically present for the key portions of the evaluation and management (E/M) service provided.      Melvin Deal MD  Acute and Critical Care Surgery Critical Care Dx    S37.69XA Uterine perforation, initial encounter   -s/p ex lap, delivery, oversewing of hysterotomy, EBL 3.5L, well resuscitated   R57.8 Hemorrhagic shock   -lactate elevated but hemodynamically stable, perfusing well  R07.1 Chest pain on breathing   -CXR negative, saturation well, unlabored breathing. EKG normal, troponins not needed right now. No left arm pain, no pain in jaw. BP stable     The patient is a critical care patient with life post-hemorrhagic threatening hemodynamic instability in SICU.  I have personally interviewed and examined the patient, reviewed data and laboratory tests/x-rays and all pertinent electronic images.  The SICU team has a constant risk benefit analyzes discussion with the primary team, all consultants, House Staff and PA's on all decisions.   Time involved in performance of separately billable procedures was not counted toward my critical care time. There is no overlap.    I have personally provided 60 minutes ([id number]) of critical care time concurrently with the resident/fellow. This time excludes time spent on separate procedures and time spent teaching. I have reviewed the resident's/fellow's documentation and agree with the assessment and plan of care.  I was physically present for the key portions of the evaluation and management (E/M) service provided.      Melvin Deal MD  Acute and Critical Care Surgery

## 2020-08-16 NOTE — ED PROVIDER NOTE - CLINICAL SUMMARY MEDICAL DECISION MAKING FREE TEXT BOX
Kvng-PGY2: 25 year old female  currently 35w pregnancy presents with bilateral lower abdominal pain x 3 hours. Unclear etiology, no vaginal bleeding. Initially hypotensive, blood pressure improved. OB/GYN at bedside, pt taken to L&D for further evaluation and management. 25 year old female  (previously PPROM and classic c section at 25 wks) currently 35w pregnancy presents with bilateral lower abdominal pain x 3 hours. Is hypotensive in triage, with abd tenderness, ill appearing. In moderate distress.  Unclear etiology, no vaginal bleeding. Concern for urterine rupture vs placental abruption vs PTL.  Send pre-op labs, obtain emergent Ob eval.

## 2020-08-16 NOTE — CHART NOTE - NSCHARTNOTEFT_GEN_A_CORE
OB Chart Note:    Patient seen at bedside w/ OB attending due to report from RN that she has questions. Patient appears well, currently OOB and in the chair. She is now off of pressors.     O:   Vital Signs Last 24 Hrs  T(C): 36.1 (16 Aug 2020 12:00), Max: 36.6 (16 Aug 2020 04:38)  T(F): 97 (16 Aug 2020 12:00), Max: 97.9 (16 Aug 2020 04:38)  HR: 92 (16 Aug 2020 16:00) (79 - 115)  BP: 108/62 (16 Aug 2020 16:00) (86/67 - 126/71)  BP(mean): 72 (16 Aug 2020 16:00) (62 - 90)  RR: 16 (16 Aug 2020 16:00) (12 - 24)  SpO2: 99% (16 Aug 2020 16:00) (97% - 100%)    Labs:  Blood type: O Positive  Rubella IgG: RPR:                           11.7   16.37<H> >-----------< 120<L>    (  @ 13:00 )             34.3<L>                        12.3   19.98<H> >-----------< 110<L>    (  @ 06:45 )             37.1                        12.2   17.78<H> >-----------< 173    (  @ 04:13 )             38.6    20 @ 06:45      137  |  106  |  6<L>  ----------------------------<  126<H>  4.0   |  19<L>  |  0.34<L>    20 @ 04:13      136  |  101  |  7   ----------------------------<  133<H>  4.1   |  20<L>  |  0.47<L>        Ca    8.6      16 Aug 2020 06:45  Ca    9.5      16 Aug 2020 04:13  Phos  2.7       Mg     1.5<L>         TPro  5.4<L>  /  Alb  3.1<L>  /  TBili  0.4  /  DBili  x   /  AST  20  /  ALT  10  /  AlkPhos  119  20 @ 06:45  TPro  6.7  /  Alb  3.5  /  TBili  < 0.2<L>  /  DBili  x   /  AST  28  /  ALT  14  /  AlkPhos  155<H>  20 @ 04:13      A/P: 26y/o , h/o classical  section at 26wks, presented to the ED @ 35+2wks GA complaining of n/v and abdominal pain, and was found to be hypotensive, concerning for uterine rupture. Patient is now s/p an emergent LTCS and B-oneal sutures, c/b hemorrhagic shock, requiring pressor support intra-op. EBL 3500ml. Massive transfusion protocol initiated, and patient received 3u pRBCs and 1u FFP. She was transferred to the SICU immediately following the procedure. She is now status post pressors. Post-op, the patient complained of pleuritic chest pain. EKG NSR, troponins being trended, CXR wnl. Patient recovering well post-operatively.     Neuro: Pain well controlled, continue Tylenol standing and oxy prn.   CV: S/p pressors for BP maintenance; monitor BPs; HR wnl; H/H trend as follows: 12.2/38.6->12.3/37.1->11.7/34.3; plts: 173->110->120  Pulm: Saturating well on room air, encourage incentive spirometer, oob/amb when possible. Patient c/o pleuritic chest pain, CXR wnl, Troponin 23->11, ekg wnl. Chest discomfort reproducible on exam, likely musculoskeletal in origin.   GI: Advance to regular diet per SICU.  : UOP adequate, continue strict Is/Os.  Heme: SCDs for DVT ppx; Start HSQ 5,000 BID.  FEN: LR@125. Replete electrolytes prn.  ID: Afebrile.  Endo: No active issues.   Dispo: Continue excellent care as per SICU.    Patient seen w/ OB attending, Dr. Shane Garza PGY-3 OB Chart Note:    Patient seen at bedside w/ OB attending due to report from RN that she has questions. Patient appears well, currently OOB and in the chair. She is now off of pressors.     O:   Vital Signs Last 24 Hrs  T(C): 36.1 (16 Aug 2020 12:00), Max: 36.6 (16 Aug 2020 04:38)  T(F): 97 (16 Aug 2020 12:00), Max: 97.9 (16 Aug 2020 04:38)  HR: 92 (16 Aug 2020 16:00) (79 - 115)  BP: 108/62 (16 Aug 2020 16:00) (86/67 - 126/71)  BP(mean): 72 (16 Aug 2020 16:00) (62 - 90)  RR: 16 (16 Aug 2020 16:00) (12 - 24)  SpO2: 99% (16 Aug 2020 16:00) (97% - 100%)    Labs:  Blood type: O Positive  Rubella IgG: RPR:                           11.7   16.37<H> >-----------< 120<L>    (  @ 13:00 )             34.3<L>                        12.3   19.98<H> >-----------< 110<L>    (  @ 06:45 )             37.1                        12.2   17.78<H> >-----------< 173    (  @ 04:13 )             38.6    20 @ 06:45      137  |  106  |  6<L>  ----------------------------<  126<H>  4.0   |  19<L>  |  0.34<L>    20 @ 04:13      136  |  101  |  7   ----------------------------<  133<H>  4.1   |  20<L>  |  0.47<L>        Ca    8.6      16 Aug 2020 06:45  Ca    9.5      16 Aug 2020 04:13  Phos  2.7       Mg     1.5<L>         TPro  5.4<L>  /  Alb  3.1<L>  /  TBili  0.4  /  DBili  x   /  AST  20  /  ALT  10  /  AlkPhos  119  20 @ 06:45  TPro  6.7  /  Alb  3.5  /  TBili  < 0.2<L>  /  DBili  x   /  AST  28  /  ALT  14  /  AlkPhos  155<H>  20 @ 04:13      A/P: 24y/o , h/o classical  section at 26wks, presented to the ED @ 35+2wks GA complaining of n/v and abdominal pain, and was found to be hypotensive, concerning for uterine rupture. Patient is now s/p an emergent LTCS and B-oneal sutures, c/b hemorrhagic shock, requiring pressor support intra-op. EBL 3500ml. Massive transfusion protocol initiated, and patient received 3u pRBCs and 1u FFP. She was transferred to the SICU immediately following the procedure. She is now status post pressors. Post-op, the patient complained of pleuritic chest pain. EKG NSR, troponins being trended, CXR wnl. Patient recovering well post-operatively.     Neuro: Pain well controlled, continue Tylenol standing and oxy prn.   CV: S/p pressors for BP maintenance; monitor BPs; HR wnl; H/H trend as follows: 12.2/38.6->12.3/37.1->11.7/34.3; plts: 173->110->120. Lactate 3.0, would trend it.  Pulm: Saturating well on room air, encourage incentive spirometer, oob/amb when possible. Patient c/o pleuritic chest pain, CXR wnl, Troponin 23->11, ekg wnl. Chest discomfort reproducible on exam, likely musculoskeletal in origin.   GI: Advance to regular diet per SICU.  : UOP adequate, continue strict Is/Os.  Heme: SCDs for DVT ppx; Start HSQ 5,000 BID.  FEN: LR@125. Replete electrolytes prn.  ID: Afebrile.  Endo: No active issues.   Dispo: Continue excellent care as per SICU.    Patient seen w/ OB attending, Dr. Shane Garza PGY-3 OB Chart Note:    Patient seen at bedside w/ OB attending due to report from RN that she has questions. Patient appears well, currently OOB and in the chair. She is now off of pressors.   No complaints to offer.    O:   Vital Signs Last 24 Hrs  T(C): 36.1 (16 Aug 2020 12:00), Max: 36.6 (16 Aug 2020 04:38)  T(F): 97 (16 Aug 2020 12:00), Max: 97.9 (16 Aug 2020 04:38)  HR: 92 (16 Aug 2020 16:00) (79 - 115)  BP: 108/62 (16 Aug 2020 16:00) (86/67 - 126/71)  BP(mean): 72 (16 Aug 2020 16:00) (62 - 90)  RR: 16 (16 Aug 2020 16:00) (12 - 24)  SpO2: 99% (16 Aug 2020 16:00) (97% - 100%)    Labs:  Blood type: O Positive  Rubella IgG: RPR:                           11.7   16.37<H> >-----------< 120<L>    (  @ 13:00 )             34.3<L>                        12.3   19.98<H> >-----------< 110<L>    (  @ 06:45 )             37.1                        12.2   17.78<H> >-----------< 173    (  @ 04:13 )             38.6    20 @ 06:45      137  |  106  |  6<L>  ----------------------------<  126<H>  4.0   |  19<L>  |  0.34<L>    20 @ 04:13      136  |  101  |  7   ----------------------------<  133<H>  4.1   |  20<L>  |  0.47<L>      Ca    8.6      16 Aug 2020 06:45    Ca    9.5      16 Aug 2020 04:13  Phos  2.7       Mg     1.5<L>         TPro  5.4<L>  /  Alb  3.1<L>  /  TBili  0.4  /  DBili  x   /  AST  20  /  ALT  10  /  AlkPhos  119  20 @ 06:45  TPro  6.7  /  Alb  3.5  /  TBili  < 0.2<L>  /  DBili  x   /  AST  28  /  ALT  14  /  AlkPhos  155<H>  20 @ 04:13      A/P: 24y/o , h/o classical  section at 26wks, presented to the ED @ 35+2wks GA complaining of n/v and abdominal pain, and was found to be hypotensive, concerning for uterine rupture. Patient is now s/p an emergent LTCS and B-oneal sutures, c/b hemorrhagic shock, requiring pressor support intra-op. EBL 3500ml. Massive transfusion protocol initiated, and patient received 3u pRBCs and 1u FFP. She was transferred to the SICU immediately following the procedure. She is now status post pressors. Post-op, the patient complained of pleuritic chest pain. EKG NSR, troponins being trended, CXR wnl. Patient recovering well post-operatively.     Neuro: Pain well controlled, continue Tylenol standing and oxy prn.   CV: S/p pressors for BP maintenance; monitor BPs; HR wnl; H/H trend as follows: 12.2/38.6->12.3/37.1->11.7/34.3; plts: 173->110->120. Lactate 3.0, would trend it.  Pulm: Saturating well on room air, encourage incentive spirometer, oob/amb when possible. Patient c/o pleuritic chest pain, CXR wnl, Troponin 23->11, ekg wnl. Chest discomfort reproducible on exam, likely musculoskeletal in origin.   GI: Advance to regular diet per SICU.  : UOP adequate, continue strict Is/Os.  Heme: SCDs for DVT ppx; Start HSQ 5,000 BID.  FEN: LR@125. Replete electrolytes prn.  ID: Afebrile.  Endo: No active issues.   Dispo: Continue excellent care as per SICU.    Patient seen w/ OB attending, Dr. Shane Garza PGY-3

## 2020-08-16 NOTE — PROGRESS NOTE ADULT - ASSESSMENT
A/P: 24y/o , h/o classical  section at 26wks, presented to the ED @ 35+2wks GA complaining of n/v and abdominal pain, and was found to be hypotensive, concerning for uterine rupture. Patient is now s/p an emergent LTCS, c/b hemorrhagic shock, requiring pressor support intra-op. EBL 3500ml. Massive transfusion protocol initiated, and patient received 3u pRBCs and 1u FFP. She was transferred to the SICU immediately following the procedure. She continues to require pressor support in SICU, however, it is currently being titrated down. Post-op, patient complained of pleuritic chest pain. EKG NSR, troponins being trended, CXR wnl. Patient recovering well post-operatively.     -Recomm    Ly Garza PGY-3 A/P: 26y/o , h/o classical  section at 26wks, presented to the ED @ 35+2wks GA complaining of n/v and abdominal pain, and was found to be hypotensive, concerning for uterine rupture. Patient is now s/p an emergent LTCS and B-oneal sutures, c/b hemorrhagic shock, requiring pressor support intra-op. EBL 3500ml. Massive transfusion protocol initiated, and patient received 3u pRBCs and 1u FFP. She was transferred to the SICU immediately following the procedure. She continues to require pressor support in SICU, however, it is currently being titrated down. Post-op, the patient complained of pleuritic chest pain. EKG NSR, troponins being trended, CXR wnl. Patient recovering well post-operatively.     Neuro: Pain well controlled, continue Tylenol standing and oxy prn.   CV: Currently on pressor support for BP maintenance; continue to tritrate per SICU; HR wnl; f/u repeat labs.  Pulm: Saturating well on room air, encourage incentive spirometer, oob/amb when possible. Patient c/o pleuritic chest pain, CXR wnl, Troponin 23->11, ekg wnl. Chest discomfort reproducible on exam, likely musculoskeletal in origin.   GI: Advance to regular diet per SICU.  : UOP adequate, continue strict Is/Os.  Heme: SCDs for DVT ppx; consider HSQ if repeat labs appropriate.  FEN: LR@125. Replete electrolytes prn.  ID: Afebrile.  Endo: No active issues.   Dispo: Continue excellent care as per SICU.    Patient seen and examined w/ MFM attending, Dr. Murphy and Dr. Clay, DEMETRIM Fellow  Ly Garza PGY-3 A/P: 24y/o , h/o classical  section at 26wks, presented to the ED @ 35+2wks GA complaining of n/v and abdominal pain, and was found to be hypotensive, concerning for uterine rupture. Patient is now s/p an emergent LTCS and B-oneal sutures, c/b hemorrhagic shock, requiring pressor support intra-op. EBL 3500ml. Massive transfusion protocol initiated, and patient received 3u pRBCs and 1u FFP. She was transferred to the SICU immediately following the procedure. She continues to require pressor support in SICU, however, it is currently being titrated down. Post-op, the patient complained of pleuritic chest pain. EKG NSR, troponins being trended, CXR wnl. Patient recovering well post-operatively.     Neuro: Pain well controlled, continue Tylenol standing and oxy prn.   CV: Currently on pressor support for BP maintenance; continue to tritrate per SICU; HR wnl; f/u repeat labs. Lactate 3.0, would trend it.  Pulm: Saturating well on room air, encourage incentive spirometer, oob/amb when possible. Patient c/o pleuritic chest pain, CXR wnl, Troponin 23->11, ekg wnl. Chest discomfort reproducible on exam, likely musculoskeletal in origin.   GI: Advance to regular diet per SICU.  : UOP adequate, continue strict Is/Os.  Heme: SCDs for DVT ppx; consider HSQ if repeat labs appropriate.  FEN: LR@125. Replete electrolytes prn.  ID: Afebrile.   Endo: No active issues.   Dispo: Continue excellent care as per SICU.    Patient seen and examined w/ MFM attending, Dr. Murphy and Dr. Clay, DEMETRIM Fellow  Ly Garza PGY-3

## 2020-08-16 NOTE — CONSULT NOTE ADULT - ASSESSMENT
Assessment:  26 y/o female hx of ,  35w2d uterine rupture, now s/p  and uterine repair    PLAN:  NEURO:  - awake, alert, not on sedation  - pain control with tylenol, oxycodone; no toradol    RESPIRATORY:   - satting well on NC    CARDIOVASCULAR:  - on bouchra, wean pressor as tolerated    GI/NUTRITION:  - NPO    GENITOURINARY/RENAL: hx , uterine rupture  - monitor UOP  - IVF @ 100    HEMATOLOGIC: EBL 3.5L, s/p 3u pRBC  - monitor h&h; f/u postop labs  - q6 CBC  - transfuse as needed  - hold dvt ppx    INFECTIOUS DISEASE:  - no active issue    ENDOCRINE:  - no active issue  - q6h fs while NPO    DISPO:  - SICU Assessment:  24 y/o female hx of ,  35w2d uterine rupture, now s/p  and uterine repair    PLAN:  NEURO:  - awake, alert, not on sedation  - pain control with tylenol, oxycodone; no toradol    RESPIRATORY:   - satting well on RA    CARDIOVASCULAR:  - on bouchra, wean pressor as tolerated    GI/NUTRITION:  - NPO    GENITOURINARY/RENAL: hx , uterine rupture  - monitor UOP  - LR @ 125    HEMATOLOGIC: EBL 3.5L, s/p 3u pRBC  - monitor h&h; f/u postop labs  - q6 CBC  - transfuse as needed  - hold dvt ppx    INFECTIOUS DISEASE:  - no active issue    ENDOCRINE:  - no active issue  - q6h fs while NPO    DISPO:  - SICU Assessment:  26 y/o female hx of ,  35w2d uterine rupture, now s/p  and uterine repair    PLAN:  NEURO:  - awake, alert, not on sedation  - pain control with tylenol, oxycodone; no toradol    RESPIRATORY:   - satting well on RA  - CXR for pleuritic CP  no infiltrates.     CARDIOVASCULAR:  - on bouchra, wean pressor as tolerated. Currently on 0.2   - EKG for pleuritic CP . NSR     GI/NUTRITION:  - NPO    GENITOURINARY/RENAL: hx , uterine rupture  - monitor UOP  - LR @ 125  - F/u PM BMP    HEMATOLOGIC: EBL 3.5L, s/p 3u pRBC  - monitor h&h; f/u postop labs  - q6 CBC  - transfuse as needed  - hold dvt ppx  - on Methergine, Pitocin for utertonic in setting of uterine rupture/hemorrhage    - F/u PM CBC    INFECTIOUS DISEASE:  - no active issue    ENDOCRINE:  - no active issue  - q6h fs while NPO    DISPO:  - SICU

## 2020-08-16 NOTE — OB PROVIDER H&P - NSHPPHYSICALEXAM_GEN_ALL_CORE
PE:    Gen: uncomfortable, actively vomiting, aaox3  CV: RRR, no m/r/g  Lungs: CTAB, normal WOB  Abd: ttp diffusely  SVE: 1-2/60/-3 PE:    Gen: uncomfortable, actively vomiting, aaox3  CV: RRR, no m/r/g  Lungs: CTAB, normal WOB  Abd: ttp diffusely, +guarding  SVE: 1-2/60/-3  FHT: 175, mod asia  Gann: irregular

## 2020-08-16 NOTE — ED PROVIDER NOTE - OBJECTIVE STATEMENT
25 year old female  currently 35w pregnancy presents with bilateral lower abdominal pain x 3 hours. Pt reports bilateral abdominal pain, non-radiating, constant over the past 3 hours. Pt reports 2 episodes of nonbloody nonbilious emesis. Pt admits to mild shortness of breath, denies chest pain, fevers, bloody stools, black tarry stools, vaginal bleeding, or dysuria. Previous pregnancy significant for c/s at 25 weeks. Pt seen in [facility] L&D for the same complaint 1 week ago, improved with IV hydration and discharged. Denies any additional complaints. 25 year old female  c h/o classic c section, currently 35 wks pregnancy, scheduled for C sxn next week, presents with bilateral lower abdominal pain x 3 hours. Pt reports bilateral abdominal pain, non-radiating, constant over the past 3 hours. Pt reports 2 episodes of nonbloody nonbilious emesis. Pt admits to mild shortness of breath, denies chest pain, fevers, bloody stools, black tarry stools, vaginal bleeding, or dysuria. Previous pregnancy significant for classic c/s d/t PPROM at 25 weeks. Pt seen in [facility] L&D for the same complaint 1 week ago, improved with IV hydration and discharged. Denies any additional complaints.  In triage found to be hypotensive, pale, and ill appearing.  Accompanied by mother who is providing most of hx, per pt request.

## 2020-08-16 NOTE — ED ADULT TRIAGE NOTE - CHIEF COMPLAINT QUOTE
pregnant- abd pain and lightheadedness    pt is 35 weeks pregnant... due date is ... scheduled for  on .  abd pain but no bleeding. pt feeling faint, lightheaded, pale.  bp 88/50. seen by dr klein. pt to be seen in main ed first.

## 2020-08-16 NOTE — ED PROVIDER NOTE - CONSTITUTIONAL DEVELOPMENT, MLM
Adult Nutrition  Assessment/PES    Patient Name:  Rachana Arguelles  YOB: 1945  MRN: [id number]  Admit Date:  1/11/2019    Assessment Date:  1/15/2019    Comments:  NG Cortrak placed and Tf's to begin with Fibersource goal at 60ml/hr.    Reason for Assessment     Row Name 01/15/19 0855          Reason for Assessment    Reason For Assessment  physician consult cortrak and TF's     Diagnosis  -- AMS, lung cancer, s/p VAT, HTN, CKD, Chronic low back pain         Nutrition/Diet History     Row Name 01/15/19 1203          Nutrition/Diet History    Typical Food/Fluid Intake  withdrawing from prescribed back pain medicine         Anthropometrics     Row Name 01/15/19 0855          Body Mass Index (BMI)    BMI Assessment  BMI 30-34.9: obesity grade I         Labs/Tests/Procedures/Meds     Row Name 01/15/19 0856          Labs/Procedures/Meds    Lab Results Reviewed  reviewed     Lab Results Comments  na, cr, H/H        Diagnostic Tests/Procedures    Diagnostic Test/Procedure Reviewed  reviewed     Diagnostic Test/Procedures Comments  s/p VATS resection        Medications    Pertinent Medications Reviewed  reviewed     Pertinent Medications Comments  pepcid, heparin, ayrTECm, protonix         Physical Findings     Row Name 01/15/19 0857          Physical Findings    Overall Physical Appearance  obese;on oxygen therapy     Gastrointestinal  feeding tube     Tubes  nasogastric tube     Skin  -- incision         Estimated/Assessed Needs     Row Name 01/15/19 1200          Calculation Measurements    Weight Used For Calculations  90.3 kg (199 lb 1.2 oz)        Estimated/Assessed Needs    Additional Documentation  -- 1800kcals, 72-90gm pro, 1800cc fluid        KCAL/KG    14 Kcal/Kg (kcal)  1264.2     15 Kcal/Kg (kcal)  1354.5     18 Kcal/Kg (kcal)  1625.4     20 Kcal/Kg (kcal)  1806     25 Kcal/Kg (kcal)  2257.5     30 Kcal/Kg (kcal)  2709     35 Kcal/Kg (kcal)  3160.5     40 Kcal/Kg (kcal)  3612     45 Kcal/Kg (kcal)   4063.5     50 Kcal/Kg (kcal)  4515        [place]-St. Jeor Equation    RMR ([place]-St. Jeor Equation)  1424.5        Fluid Requirements    Marcial-Blayne Method (over 20 kg)  3306         Nutrition Prescription Ordered     Row Name 01/15/19 0858          Nutrition Prescription PO    Current PO Diet  NPO         Evaluation of Received Nutrient/Fluid Intake     Row Name 01/15/19 1200          Calculation Measurements    Weight Used For Calculations  90.3 kg (199 lb 1.2 oz)         Evaluation of Prescribed Nutrient/Fluid Intake     Row Name 01/15/19 1200          Calculation Measurements    Weight Used For Calculations  90.3 kg (199 lb 1.2 oz)             Problem/Interventions:  Problem 1     Row Name 01/15/19 0900          Nutrition Diagnoses Problem 1    Problem 1  Needs Alternate Route     Etiology (related to)  MNT for Treatment/Condition                 Intervention Goal     Row Name 01/15/19 0900          Intervention Goal    General  Maintain nutrition     TF/PN  Inititiate TF/PN     Transition  TF to PO     Weight  No significant weight loss         Nutrition Intervention     Row Name 01/15/19 0900          Nutrition Intervention    RD/Tech Action  Follow Tx progress;Care plan reviewd;Recommend/ordered         Nutrition Prescription     Row Name 01/15/19 1204          Nutrition Prescription EN    Enteral Prescription  Enteral begin/change;Enteral to supply     Enteral Route  NG     Product  Fibersource HN     TF Delivery Method  Continuous     Continuous TF Goal Rate (mL/hr)  60 mL/hr     Water flush (mL)   30 mL     Water Flush Frequency  Every 4 hours        EN to Supply    Kcal/Day  1728 Kcal/Day     Protein (gm/day)  77 gm/day     Meet Estimated Kcal Need (%)  96 %     Meet Estimated Protein Need (%)  106 %     TF Free H2O (mL)  1166 mL     Total Free H2O (mL/day)  1346 mL/day         Education/Evaluation     Row Name 01/15/19 0900          Education    Education  Education not appropriate at this time      Please explain  Patient confusion        Monitor/Evaluation    Monitor  Per protocol           Electronically signed by:  Elizabeth Francois RD  01/15/19 12:17 PM   well developed

## 2020-08-16 NOTE — CHART NOTE - NSCHARTNOTEFT_GEN_A_CORE
R2 Event/Communications Note  (delayed 2/2 clinical duties)    Contacted by ED Attending regarding a patient of Dr. Howell's, 36w pregnant, p/w abdominal pain, nausea, and vomiting. Attending stated patient was hypotensive 80/40s upon arrival. Pt had known history of classical  section. Requested patient be sent up directly to Labor and Delivery - Attending stated felt uncomfortable transporting patient at this time 2/2 hypotension. Dr. Howell notified on way down to ED, instructed to see patient and get her up to labor and delivery ASAP with suspected uterine rupture. Pt evaluated immediately. Found to have /66. SVE: 1-2/60/-3. Diffuse and severe abdominal pain noted, +peritoneal. Decision made at that time to transport patient myself and requested assistance with ED RN directly to L&D PACU. Dr. Howell and Dr. Gonzales PGY-4 alerted, and appropriately alerted Charge RN and staff for incoming emergency. Dr. Jacobs PGY-3 met en route to L&D for assistance. Pt immediately transferred to PACU bed with BSS showing +FH, however with tachycardia to 170s. 2nd IV line placed, labs collected, FHT and [place] placed. Pt noted to have contractions. Anesthesia reported to bedside. NICU called and alerted of situation. Patient consented by OB and anesthesia and emergently to OR R2 Event/Communications Note  (delayed 2/2 clinical duties)    Contacted by ED Attending regarding a patient of Dr. Howell's, 36w pregnant, p/w abdominal pain, nausea, and vomiting. Attending stated patient was hypotensive 80/40s upon arrival. Pt had known history of classical  section @ 26w in 2017. OB requested patient be sent up directly to Labor and Delivery - however ED Attending stated felt uncomfortable transporting patient at this time 2/2 hypotension. Dr. Howell notified on my way down to ED, instructed me to see patient and get her up to labor and delivery ASAP with suspected uterine rupture. Pt evaluated immediately. Found to have /66. SVE: 1-2/60/-3. Diffuse and severe abdominal pain noted, +peritoneal. Decision made at that time to transport patient myself and requested assistance from ED RN directly to L&D PACU. Dr. Howell and Dr. Gonzales PGY-4 alerted on way, and appropriately alerted Charge RN and staff for incoming emergency. Dr. Galindo PGY-3 met en route to L&D for assistance. Pt immediately transferred to PACU bed with BSS showing +FH, however with tachycardia to 170s. 2nd IV line placed, labs collected, FHT and [place] placed. Pt noted to have contractions. Anesthesia reported to bedside. NICU called and alerted of situation. Patient consented by OB and anesthesia and emergently to OR.     Blood bank contacted in preparation. 2u brought up to OR.   Please see operative notes for details of surgical procedure. Noted to have uterine rupture with 1L blood loss behind uterus. 2u FFP and 1u Plt requested. MTP then called by VAISHALI Holliday. Appropriate responses from GynOnc Attending, GynOnc Fellow, and MFM Fellow. SICU contacted. Bleeding found to be under control at this time.     [place]ney PGY-2

## 2020-08-16 NOTE — ED PROVIDER NOTE - CRITICAL CARE PROVIDED
additional history taking/consultation with other physicians/consult w/ pt's family directly relating to pts condition/interpretation of diagnostic studies/direct patient care (not related to procedure)/documentation

## 2020-08-16 NOTE — OB RN DELIVERY SUMMARY - NS_ADMITLABOR_OBGYN_ALL_OB
12:09 PM PT RETURNS FROM X-RAY-DRESSING DRY AND INTACT TO LOWER ABDOMEN  12:09 PM PT AROUSES EASILY-SKIN WARM AND DRY AT PRESENT No

## 2020-08-16 NOTE — ED PROVIDER NOTE - NS ED ROS FT
Review of Systems    Constitutional: (-) fever, (-) chills, (-) fatigue  HEENT: (-) sore throat, (-) hearing loss, (-) nasal congestion  Cardiovascular: (-) chest pain, (-) syncope  Respiratory: (-) cough, (-) hemoptysis  Gastrointestinal: (+) vomiting, (-) diarrhea, (+) abdominal pain  Musculoskeletal: (-) neck pain, (-) back pain, (-) joint pain  Integumentary: (-) rash, (-) edema, (-) wound  Neurological: (-) headache, (-) altered mental status    Except as documented in the HPI, all other systems are negative.

## 2020-08-16 NOTE — ED ADULT NURSE NOTE - OBJECTIVE STATEMENT
Pt presents to 3, alert&orientedx3, ambulatory, 35 weeks pregnancy, scheduled for  , coming to ED for evaluation of Nausea and vomiting, lower abd pain that started 3 hrs prior arrival to ED. Pt states she was seen for similar symptoms x1week, was treated with IVF and was DC. Upon arrival, pt actively vomiting, loud and moaning in pain, denies any fever, chest pain but endorses slight SOB. Pt denies any water breaking or vaginal bleeding, states no complications during pregnancy at this time. 20g IV established on left AC, labs drawn and sent, receiving IVF. OBGYN at bedside for evaluation. Pt taken to L&D for r/o uterine rupture. Pt was accompanied with MD and EDT.

## 2020-08-16 NOTE — OB PROVIDER H&P - ASSESSMENT
26y/o  EGA 35w2d presented to the ED with severe abdominal pain w/ h/o classical c/s likely with uterine rupture

## 2020-08-16 NOTE — ED PROVIDER NOTE - ATTENDING CONTRIBUTION TO CARE
Attending Attestation: Dr. Jeffries  I have personally performed a history and physical examination of the patient and discussed management with the resident as well as the patient.  I reviewed the resident's note and agree with the documented findings and plan of care.  I have authored and modified critical sections of the Provider Note, including but not limited to HPI, Physical Exam and MDM. 25 year old female  (previously PPROM and classic c section at 25 wks) currently 35w pregnancy presents with bilateral lower abdominal pain x 3 hours. Is hypotensive in triage, with abd tenderness, ill appearing. In moderate distress.  Unclear etiology, no vaginal bleeding. Concern for uterine rupture vs placental abruption vs PTL.  Send pre-op labs, obtain emergent Ob eval.

## 2020-08-16 NOTE — OB RN DELIVERY SUMMARY - NS_DELBLDTRANSFUS_OBGYN_ALL_OB
Problem: Respiratory Impairment - Respiratory Therapy 253  Intervention: Inhaled medication delivery  Intervention Status  Done  Intervention: Inhaled gas administration  Intervention Status  Done  Intervention: Respiratory support devices  Intervention Status  Done         Yes

## 2020-08-16 NOTE — ED PROVIDER NOTE - PHYSICAL EXAMINATION
VITAL SIGNS: I have reviewed nursing notes and confirm.  CONSTITUTIONAL: non-toxic, well appearing  SKIN: no rash, no petechiae.  EYES: PERRL, EOMI, pink conjunctiva, anicteric  NECK: Supple; no meningismus  CARD: RRR, no murmurs, equal radial pulses bilaterally 2+  RESP: CTAB, no respiratory distress  ABD: Soft, gravid abdomen, tender over lower uterus, soft, no rebound or guarding  EXT: No edema.   NEURO: Alert, oriented. Neuro exam nonfocal, equal strength bilaterally  PSYCH: Cooperative, appropriate. VITAL SIGNS: I have reviewed nursing notes and confirm.  SKIN: no rash, no petechiae.  EYES: PERRL, EOMI, pink conjunctiva, anicteric  NECK: Supple; no meningismus  CARD: RRR, no murmurs, equal radial pulses bilaterally 2+  RESP: CTAB, no respiratory distress  ABD: Soft, gravid abdomen, tender over lower uterus, soft, no rebound or guarding  EXT: No edema.   NEURO: Alert, oriented. Neuro exam nonfocal, equal strength bilaterally  PSYCH: Cooperative, appropriate.

## 2020-08-16 NOTE — PROGRESS NOTE ADULT - SUBJECTIVE AND OBJECTIVE BOX
OB Progress Note:  Delivery, POD#0  (Patient seen approximately ~12:30pm)    Interval events:   Patient complained of pleuritic chest pain following the . Troponins being trended. EKG NSR. Patient continues on pressor support, but it is slowly being decreased.    S: 26yo POD#0 s/p LTCS in the setting of a uterine rupture. Patient complaining of shoulder and mid-line chest discomfort which is exacerbated by movement and breathing. She states it feels like a "soreness".  Otherwise, her pain related to the  is well controlled. She is tolerating a full liquid diet. Has not passed flatus. Denies N/V. Denies lightheadedness/dizziness. She has not been out of bed. Bay in place.      O:   Vital Signs Last 24 Hrs  T(C): 36.1 (16 Aug 2020 12:00), Max: 36.6 (16 Aug 2020 04:38)  T(F): 97 (16 Aug 2020 12:00), Max: 97.9 (16 Aug 2020 04:38)  HR: 90 (16 Aug 2020 13:35) (79 - 115)  BP: 101/55 (16 Aug 2020 13:35) (86/67 - 126/71)  BP(mean): 65 (16 Aug 2020 13:35) (62 - 90)  RR: 16 (16 Aug 2020 13:35) (12 - 24)  SpO2: 98% (16 Aug 2020 13:35) (97% - 100%)    Labs:  Blood type: O Positive  Rubella IgG: RPR:                           11.7   16.37<H> >-----------< 120<L>    (  @ 13:00 )             34.3<L>                        12.3   19.98<H> >-----------< 110<L>    (  @ 06:45 )             37.1                        12.2   17.78<H> >-----------< 173    (  @ 04:13 )             38.6    20 @ 06:45      137  |  106  |  6<L>  ----------------------------<  126<H>  4.0   |  19<L>  |  0.34<L>    20 @ 04:13      136  |  101  |  7   ----------------------------<  133<H>  4.1   |  20<L>  |  0.47<L>        Ca    8.6      16 Aug 2020 06:45  Ca    9.5      16 Aug 2020 04:13  Phos  2.7       Mg     1.5<L>         TPro  5.4<L>  /  Alb  3.1<L>  /  TBili  0.4  /  DBili  x   /  AST  20  /  ALT  10  /  AlkPhos  119  20 @ 06:45  TPro  6.7  /  Alb  3.5  /  TBili  < 0.2<L>  /  DBili  x   /  AST  28  /  ALT  14  /  AlkPhos  155<H>  20 @ 04:13        PE:  General: NAD, sitting up in bed  Abdomen: appropriately tender, mildly distended, incision c/d/i.  Extremities: No LE edema or tenderness OB Progress Note:  Delivery, POD#0  (Patient seen approximately ~12:30pm)    Interval events:   Patient complained of pleuritic chest pain following the . Troponins being trended. EKG NSR. Patient continues on pressor support, but it is slowly being decreased.    S: 24yo POD#0 s/p LTCS and B-oneal sutures in the setting of a uterine rupture. Patient complaining of shoulder and mid-line chest discomfort which is exacerbated by movement and breathing. She states it feels like a "soreness".  Otherwise, her pain related to the  is well controlled. She is tolerating a full liquid diet. Has not passed flatus. Denies N/V. Denies lightheadedness/dizziness. She has not been out of bed. Bay in place.      O:   Vital Signs Last 24 Hrs  T(C): 36.1 (16 Aug 2020 12:00), Max: 36.6 (16 Aug 2020 04:38)  T(F): 97 (16 Aug 2020 12:00), Max: 97.9 (16 Aug 2020 04:38)  HR: 90 (16 Aug 2020 13:35) (79 - 115)  BP: 101/55 (16 Aug 2020 13:35) (86/67 - 126/71)  BP(mean): 65 (16 Aug 2020 13:35) (62 - 90)  RR: 16 (16 Aug 2020 13:35) (12 - 24)  SpO2: 98% (16 Aug 2020 13:35) (97% - 100%)      I&O's Summary    15 Aug 2020 07:  -  16 Aug 2020 07:00  --------------------------------------------------------  IN: 1500 mL / OUT: 4100 mL / NET: -2600 mL    16 Aug 2020 07:  -  16 Aug 2020 16:18  --------------------------------------------------------  IN: 2646 mL / OUT: 1335 mL / NET: 1311 mL      Labs:  Blood type: O Positive  Rubella IgG: RPR:                          12.3   19.98<H> >-----------< 110<L>    (  @ 06:45 )             37.1                        12.2   17.78<H> >-----------< 173    (  @ 04:13 )             38.6    20 @ 06:45      137  |  106  |  6<L>  ----------------------------<  126<H>  4.0   |  19<L>  |  0.34<L>    20 @ 04:13      136  |  101  |  7   ----------------------------<  133<H>  4.1   |  20<L>  |  0.47<L>        Ca    8.6      16 Aug 2020 06:45  Ca    9.5      16 Aug 2020 04:13  Phos  2.7       Mg     1.5<L>         TPro  5.4<L>  /  Alb  3.1<L>  /  TBili  0.4  /  DBili  x   /  AST  20  /  ALT  10  /  AlkPhos  119  20 @ 06:45  TPro  6.7  /  Alb  3.5  /  TBili  < 0.2<L>  /  DBili  x   /  AST  28  /  ALT  14  /  AlkPhos  155<H>  20 @ 04:13        PE:  General: NAD, sitting up in bed  Abdomen: appropriately tender, mildly distended, incision c/d/i.  Extremities: No LE edema or tenderness      MEDICATIONS  (STANDING):  acetaminophen   Tablet .. 975 milliGRAM(s) Oral every 6 hours  acetaminophen  IVPB .. 1000 milliGRAM(s) IV Intermittent every 6 hours  diphtheria/tetanus/pertussis (acellular) Vaccine (ADAcel) 0.5 milliLiter(s) IntraMuscular once  lactated ringers. 1000 milliLiter(s) (125 mL/Hr) IV Continuous <Continuous>  methylergonovine 0.2 milliGRAM(s) Oral every 4 hours  oxytocin Infusion 333.333 milliUNIT(s)/Min (1000 mL/Hr) IV Continuous <Continuous>  phenylephrine    Infusion 0.4 MICROgram(s)/kG/Min (6.6 mL/Hr) IV Continuous <Continuous>    MEDICATIONS  (PRN):  diphenhydrAMINE 25 milliGRAM(s) Oral every 6 hours PRN Itching  lanolin Ointment 1 Application(s) Topical every 6 hours PRN Sore Nipples  magnesium hydroxide Suspension 30 milliLiter(s) Oral two times a day PRN Constipation  oxyCODONE    IR 5 milliGRAM(s) Oral once PRN Moderate to Severe Pain (4-10)  oxyCODONE    IR 5 milliGRAM(s) Oral every 3 hours PRN Moderate to Severe Pain (4-10)  simethicone 80 milliGRAM(s) Chew every 4 hours PRN Gas

## 2020-08-16 NOTE — OB PROVIDER H&P - HISTORY OF PRESENT ILLNESS
24y/o  EGA 35w2d presented to the ED with severe abdominal pain. Previous history of classical c/s @ 26w 2/2 PTL and PPROM breech presentation. Found to be hypotensive 80s/50s. Concern for uterine rupture given history and clinical presentation. Pt seen immediately at bedside in ED and brought to PACU L&D for evaluation.

## 2020-08-16 NOTE — CONSULT NOTE ADULT - SUBJECTIVE AND OBJECTIVE BOX
Statement Selected SICU CONSULT NOTE    HPI  REYNA BARNES is a 25y Female hx of ,  35w2d presents with severe abdominal pain with hypotension to 80s/50s, concerning for uterine rupture. Patient was taken to OR for emergency  and uterine repair. Intraop, patient hypotensive requiring phenylephrine (EBL 3.5L, 2L crystalloid, 3u pRBC).     PAST MEDICAL HISTORY: No pertinent past medical history    PAST SURGICAL HISTORY:  delivery delivered    FAMILY HISTORY:     SOCIAL HISTORY:     CODE STATUS:     HOME MEDICATIONS:    ALLERGIES: No Known Allergies    VITAL SIGNS:  ICU Vital Signs Last 24 Hrs  T(C): 36.6 (16 Aug 2020 04:38), Max: 36.6 (16 Aug 2020 04:38)  T(F): 97.9 (16 Aug 2020 04:38), Max: 97.9 (16 Aug 2020 04:38)  HR: 109 (16 Aug 2020 04:38) (85 - 115)  BP: 111/66 (16 Aug 2020 04:38) (88/51 - 111/66)  BP(mean): --  ABP: --  ABP(mean): --  RR: 18 (16 Aug 2020 04:38) (18 - 20)  SpO2: 100% (16 Aug 2020 04:37) (99% - 100%)      NEURO  Exam:    RESPIRATORY  Mechanical Ventilation:     Exam:      CARDIOVASCULAR  VBG - ( 16 Aug 2020 04:13 )  pH: 7.29  /  pCO2: 53    /  pO2: 25    / HCO3: 21    / Base Excess: -1.1  /  SaO2: 33.3   Lactate: 3.0              Exam:  Cardiac Rhythm:      GI/NUTRITION  Exam:  Diet:      GENITOURINARY/RENAL      08-15 @ 07:  -   @ 06:31  --------------------------------------------------------  IN:    Other: 1500 mL  Total IN: 1500 mL    OUT:    Estimated Blood Loss: 3500 mL    Indwelling Catheter - Urethral: 350 mL  Total OUT: 3850 mL    Total NET: -2350 mL        Weight (kg): 88 ( @ 04:38)      136  |  101  |  7   ----------------------------<  133<H>  4.1   |  20<L>  |  0.47<L>    Ca    9.5      16 Aug 2020 04:13    TPro  6.7  /  Alb  3.5  /  TBili  < 0.2<L>  /  DBili  x   /  AST  28  /  ALT  14  /  AlkPhos  155<H>  08-16    [ ] Bay catheter, indication: urine output monitoring in critically ill patient    HEMATOLOGIC  [ ] VTE Prophylaxis:                          12.2   17.78 )-----------( 173      ( 16 Aug 2020 04:13 )             38.6     PT/INR - ( 16 Aug 2020 04:44 )   PT: 12.5 SEC;   INR: 1.10          PTT - ( 16 Aug 2020 04:44 )  PTT:28.3 SEC  Transfusion: [ ] PRBC	[ ] Platelets	[ ] FFP	[ ] Cryoprecipitate      INFECTIOUS DISEASES    RECENT CULTURES:      ENDOCRINE    CAPILLARY BLOOD GLUCOSE          PATIENT CARE ACCESS DEVICES:  [ ] Peripheral IV  [ ] Central Venous Line	[ ] R	[ ] L	[ ] IJ	[ ] Fem	[ ] SC	Placed:   [ ] Arterial Line		[ ] R	[ ] L	[ ] Fem	[ ] Rad	[ ] Ax	Placed:   [ ] PICC:					[ ] Mediport  [ ] Urinary Catheter, Date Placed:   [x] Necessity of urinary, arterial, and venous catheters discussed    OTHER MEDICATIONS:     IMAGING STUDIES: SICU CONSULT NOTE    HPI  REYNA BARNES is a 25y Female hx of ,  35w2d presents with severe abdominal pain with hypotension to 80s/50s, concerning for uterine rupture. Patient was taken to OR for emergency  and uterine repair. Intraop, patient hypotensive requiring phenylephrine (EBL 3.5L, 1.5L crystalloid, 3u pRBC, 1FFP). SICU consulted for postop hypotension  2/2 hemorrhagic shock requiring vasopressor    PAST MEDICAL HISTORY: No pertinent past medical history    PAST SURGICAL HISTORY:  delivery delivered    FAMILY HISTORY:     SOCIAL HISTORY:     CODE STATUS:     HOME MEDICATIONS:    ALLERGIES: No Known Allergies    VITAL SIGNS:  ICU Vital Signs Last 24 Hrs  T(C): 36.6 (16 Aug 2020 04:38), Max: 36.6 (16 Aug 2020 04:38)  T(F): 97.9 (16 Aug 2020 04:38), Max: 97.9 (16 Aug 2020 04:38)  HR: 109 (16 Aug 2020 04:38) (85 - 115)  BP: 111/66 (16 Aug 2020 04:38) (88/51 - 111/66)  BP(mean): --  ABP: --  ABP(mean): --  RR: 18 (16 Aug 2020 04:38) (18 - 20)  SpO2: 100% (16 Aug 2020 04:37) (99% - 100%)    NEURO  - awake, sedated    RESPIRATORY  - satting well on NC    CARDIOVASCULAR  VBG - ( 16 Aug 2020 04:13 )  pH: 7.29  /  pCO2: 53    /  pO2: 25    / HCO3: 21    / Base Excess: -1.1  /  SaO2: 33.3   Lactate: 3.0      GI/NUTRITION  Exam: soft, appropriately tender  Diet: NPO    GENITOURINARY/RENAL    08-15 @ 07:  -  16 @ 06:31  --------------------------------------------------------  IN:    Other: 1500 mL  Total IN: 1500 mL    OUT:    Estimated Blood Loss: 3500 mL    Indwelling Catheter - Urethral: 350 mL  Total OUT: 3850 mL    Total NET: -2350 mL        Weight (kg): 88 ( @ 04:38)      136  |  101  |  7   ----------------------------<  133<H>  4.1   |  20<L>  |  0.47<L>    Ca    9.5      16 Aug 2020 04:13    TPro  6.7  /  Alb  3.5  /  TBili  < 0.2<L>  /  DBili  x   /  AST  28  /  ALT  14  /  AlkPhos  155<H>      [x] Bay catheter, indication: urine output monitoring in critically ill patient    HEMATOLOGIC  [ ] VTE Prophylaxis: hold                          12.2   17.78 )-----------( 173      ( 16 Aug 2020 04:13 )             38.6     PT/INR - ( 16 Aug 2020 04:44 )   PT: 12.5 SEC;   INR: 1.10          PTT - ( 16 Aug 2020 04:44 )  PTT:28.3 SEC  Transfusion: [x] PRBC	[ ] Platelets	[x] FFP	[ ] Cryoprecipitate      INFECTIOUS DISEASES    RECENT CULTURES:    ENDOCRINE    CAPILLARY BLOOD GLUCOSE    PATIENT CARE ACCESS DEVICES:  [x] Peripheral IV  [ ] Central Venous Line	[ ] R	[ ] L	[ ] IJ	[ ] Fem	[ ] SC	Placed:   [ ] Arterial Line		[ ] R	[ ] L	[ ] Fem	[ ] Rad	[ ] Ax	Placed:   [ ] PICC:					[ ] Mediport  [x] Urinary Catheter, Date Placed:   [x] Necessity of urinary, arterial, and venous catheters discussed    OTHER MEDICATIONS:     IMAGING STUDIES: SICU CONSULT NOTE    HPI  REYNA BARNES is a 25y Female hx of ,  35w2d presents with severe abdominal pain with hypotension to 80s/50s, concerning for uterine rupture. Patient was taken to OR for emergency  and uterine repair. Intraop, patient hypotensive requiring phenylephrine. Intraop hemostasis achieved with methergine, pitocin, TXA, b-oneal suture. (EBL 3.5L, 1.5L crystalloid, 3u pRBC, 1FFP). SICU consulted for postop hypotension  2/2 hemorrhagic shock requiring vasopressor    PAST MEDICAL HISTORY: No pertinent past medical history    PAST SURGICAL HISTORY:  delivery delivered    FAMILY HISTORY:     SOCIAL HISTORY:     CODE STATUS:     HOME MEDICATIONS:    ALLERGIES: No Known Allergies    VITAL SIGNS:  ICU Vital Signs Last 24 Hrs  T(C): 36.6 (16 Aug 2020 04:38), Max: 36.6 (16 Aug 2020 04:38)  T(F): 97.9 (16 Aug 2020 04:38), Max: 97.9 (16 Aug 2020 04:38)  HR: 109 (16 Aug 2020 04:38) (85 - 115)  BP: 111/66 (16 Aug 2020 04:38) (88/51 - 111/66)  BP(mean): --  ABP: --  ABP(mean): --  RR: 18 (16 Aug 2020 04:38) (18 - 20)  SpO2: 100% (16 Aug 2020 04:37) (99% - 100%)    NEURO  - awake, sedated    RESPIRATORY  - satting well on NC    CARDIOVASCULAR  VBG - ( 16 Aug 2020 04:13 )  pH: 7.29  /  pCO2: 53    /  pO2: 25    / HCO3: 21    / Base Excess: -1.1  /  SaO2: 33.3   Lactate: 3.0      GI/NUTRITION  Exam: soft, appropriately tender  Diet: NPO    GENITOURINARY/RENAL    08-15 @ 07:  -  -16 @ 06:31  --------------------------------------------------------  IN:    Other: 1500 mL  Total IN: 1500 mL    OUT:    Estimated Blood Loss: 3500 mL    Indwelling Catheter - Urethral: 350 mL  Total OUT: 3850 mL    Total NET: -2350 mL        Weight (kg): 88 ( @ 04:38)      136  |  101  |  7   ----------------------------<  133<H>  4.1   |  20<L>  |  0.47<L>    Ca    9.5      16 Aug 2020 04:13    TPro  6.7  /  Alb  3.5  /  TBili  < 0.2<L>  /  DBili  x   /  AST  28  /  ALT  14  /  AlkPhos  155<H>      [x] Bay catheter, indication: urine output monitoring in critically ill patient    HEMATOLOGIC  [ ] VTE Prophylaxis: hold                          12.2   17.78 )-----------( 173      ( 16 Aug 2020 04:13 )             38.6     PT/INR - ( 16 Aug 2020 04:44 )   PT: 12.5 SEC;   INR: 1.10          PTT - ( 16 Aug 2020 04:44 )  PTT:28.3 SEC  Transfusion: [x] PRBC	[ ] Platelets	[x] FFP	[ ] Cryoprecipitate      INFECTIOUS DISEASES    RECENT CULTURES:    ENDOCRINE    CAPILLARY BLOOD GLUCOSE    PATIENT CARE ACCESS DEVICES:  [x] Peripheral IV  [ ] Central Venous Line	[ ] R	[ ] L	[ ] IJ	[ ] Fem	[ ] SC	Placed:   [ ] Arterial Line		[ ] R	[ ] L	[ ] Fem	[ ] Rad	[ ] Ax	Placed:   [ ] PICC:					[ ] Mediport  [x] Urinary Catheter, Date Placed:   [x] Necessity of urinary, arterial, and venous catheters discussed    OTHER MEDICATIONS:     IMAGING STUDIES: SICU CONSULT NOTE    HPI  REYNA BARNES is a 25y Female hx of ,  35w2d presents with severe abdominal pain with hypotension to 80s/50s, concerning for uterine rupture. Patient was taken to OR for emergency  and uterine repair. Intraop, patient hypotensive requiring phenylephrine. Intraop hemostasis achieved with methergine, pitocin, TXA, b-oneal suture. (EBL 3.5L, 1.5L crystalloid, 3u pRBC, 1FFP, ). SICU consulted for postop hypotension  2/2 hemorrhagic shock requiring vasopressor    PAST MEDICAL HISTORY: No pertinent past medical history    PAST SURGICAL HISTORY:  delivery delivered    FAMILY HISTORY:     SOCIAL HISTORY:     CODE STATUS:     HOME MEDICATIONS:    ALLERGIES: No Known Allergies    VITAL SIGNS:  ICU Vital Signs Last 24 Hrs  T(C): 36.6 (16 Aug 2020 04:38), Max: 36.6 (16 Aug 2020 04:38)  T(F): 97.9 (16 Aug 2020 04:38), Max: 97.9 (16 Aug 2020 04:38)  HR: 109 (16 Aug 2020 04:38) (85 - 115)  BP: 111/66 (16 Aug 2020 04:38) (88/51 - 111/66)  BP(mean): --  ABP: --  ABP(mean): --  RR: 18 (16 Aug 2020 04:38) (18 - 20)  SpO2: 100% (16 Aug 2020 04:37) (99% - 100%)    NEURO  - awake, sedated    RESPIRATORY  - satting well on NC    CARDIOVASCULAR  VBG - ( 16 Aug 2020 04:13 )  pH: 7.29  /  pCO2: 53    /  pO2: 25    / HCO3: 21    / Base Excess: -1.1  /  SaO2: 33.3   Lactate: 3.0      GI/NUTRITION  Exam: soft, appropriately tender  Diet: NPO    GENITOURINARY/RENAL    0815 @ 07:01  -  08-16 @ 06:31  --------------------------------------------------------  IN:    Other: 1500 mL  Total IN: 1500 mL    OUT:    Estimated Blood Loss: 3500 mL    Indwelling Catheter - Urethral: 350 mL  Total OUT: 3850 mL    Total NET: -2350 mL        Weight (kg): 88 ( @ 04:38)      136  |  101  |  7   ----------------------------<  133<H>  4.1   |  20<L>  |  0.47<L>    Ca    9.5      16 Aug 2020 04:13    TPro  6.7  /  Alb  3.5  /  TBili  < 0.2<L>  /  DBili  x   /  AST  28  /  ALT  14  /  AlkPhos  155<H>      [x] Bay catheter, indication: urine output monitoring in critically ill patient    HEMATOLOGIC  [ ] VTE Prophylaxis: hold                          12.2   17.78 )-----------( 173      ( 16 Aug 2020 04:13 )             38.6     PT/INR - ( 16 Aug 2020 04:44 )   PT: 12.5 SEC;   INR: 1.10          PTT - ( 16 Aug 2020 04:44 )  PTT:28.3 SEC  Transfusion: [x] PRBC	[ ] Platelets	[x] FFP	[ ] Cryoprecipitate      INFECTIOUS DISEASES    RECENT CULTURES:    ENDOCRINE    CAPILLARY BLOOD GLUCOSE    PATIENT CARE ACCESS DEVICES:  [x] Peripheral IV  [ ] Central Venous Line	[ ] R	[ ] L	[ ] IJ	[ ] Fem	[ ] SC	Placed:   [ ] Arterial Line		[ ] R	[ ] L	[ ] Fem	[ ] Rad	[ ] Ax	Placed:   [ ] PICC:					[ ] Mediport  [x] Urinary Catheter, Date Placed:   [x] Necessity of urinary, arterial, and venous catheters discussed    OTHER MEDICATIONS:     IMAGING STUDIES:

## 2020-08-16 NOTE — OB PROVIDER DELIVERY SUMMARY - NSPROVIDERDELIVERYNOTE_OBGYN_ALL_OB_FT
Patient with hypotension, severe abdominal pain, high concern for uterine rupture, decision made to proceed with emergent  section  caridad blood noted upon entry to peritoneal cavity  low transverse uterine incision made  viable male infant, vertex presentation, Apgars X/X, cord gasses sent  exteriorization of uterus revealed 10cm vertical rupture on left anterior portion of uterus  grossly normal fallopian tubes and ovaries  uterine rupture closed in 1 layer with caprosyn  hysterotomy closed in 1 layer with caprosyn  3 modified b-oneal sutures placed prophylactically given high concern for DIC  TXA, methergine IM x1, additional pitocin given  interceed placed over uterine rupture site  fibrilar placed over hysterotomy site  excellent hemostasis noted    EBL: 3500  IVF:   UOP     WILLIAM Galindo PGY3  w/ HARRY Gonzales PGY4  w/ Dr. Howell R3 Delivery Summary    Patient with hypotension, severe abdominal pain, concern for uterine rupture, decision made to proceed with emergent  section  Fernando blood noted upon entry to peritoneal cavity  fetal was palpated in the lower uterine segment  uterus initially without obvious location of rupture through which fetus could be delivered  decision made to proceed with a low transverse uterine incision  viable male infant, vertex presentation, Apgars 5/7/8, cord gasses sent  exteriorization of uterus revealed 10cm vertical rupture on left anterior portion of uterus that was not palpable prior secondary to malrotation  grossly normal fallopian tubes and ovaries  uterine rupture closed in 1 layer with caprosyn  hysterotomy closed in 1 layer with caprosyn  3 modified b-oneal sutures placed prophylactically given high concern for DIC  TXA, methergine IM x1, additional pitocin given  interceed placed over uterine rupture site  fibrilar placed over hysterotomy site  excellent hemostasis noted  SICU consulted intraoperatively     EBL: 3500  IVF: 1500 + 3u PRBCs + 1u FFP  UOP: 275    WILLIAM Galindo PGY3  w/ HARRY Gonzales PGY4  w/ Dr. 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 Howell

## 2020-08-16 NOTE — ED ADULT NURSE NOTE - NSIMPLEMENTINTERV_GEN_ALL_ED
Implemented All Fall Risk Interventions:  [place] to call system. Call bell, personal items and telephone within reach. Instruct patient to call for assistance. Room bathroom lighting operational. Non-slip footwear when patient is off stretcher. Physically safe environment: no spills, clutter or unnecessary equipment. Stretcher in lowest position, wheels locked, appropriate side rails in place. Provide visual cue, wrist band, yellow gown, etc. Monitor gait and stability. Monitor for mental status changes and reorient to person, place, and time. Review medications for side effects contributing to fall risk. Reinforce activity limits and safety measures with patient and family.

## 2020-08-16 NOTE — ED ADULT NURSE NOTE - NSSUHOSCREENINGYN_ED_ALL_ED
DISCHARGE PLANNING     Discharge to home or other facility with appropriate resources Progressing        DISCHARGE PLANNING - CARE MANAGEMENT     Discharge to post-acute care or home with appropriate resources Progressing        INFECTION - ADULT     Absence or prevention of progression during hospitalization Progressing     Absence of fever/infection during neutropenic period Progressing        Knowledge Deficit     Patient/family/caregiver demonstrates understanding of disease process, treatment plan, medications, and discharge instructions Progressing        Nutrition/Hydration-ADULT     Nutrient/Hydration intake appropriate for improving, restoring or maintaining nutritional needs Progressing        PAIN - ADULT     Verbalizes/displays adequate comfort level or baseline comfort level Progressing        Potential for Falls     Patient will remain free of falls Progressing        SAFETY ADULT     Patient will remain free of falls Progressing     Maintain or return to baseline ADL function Progressing     Maintain or return mobility status to optimal level Progressing No - the patient is unable to be screened due to medical condition

## 2020-08-16 NOTE — OB PROVIDER H&P - PROBLEM SELECTOR PLAN 1
- pt transported directly to L&D by myself, Dr. Galindo PGY-3, and ED RN  - Immediately to PACU for FHR check, BSS and [place]. BSS showing +FH w/ tachycardia  - Emergently to OR for likely uterine rupture  - CBC, Coags, CMP, Amylase/Lipase, Lactate sent  - Anesthesia alerted  - Pediatrics/NICU alerted    d/w Dr. Howell and Dr. Gonzales, PGY-4, Dr. Jacobs PGY-3, Charge RN, and nursing staff at bedside.   ADomney PGY-2

## 2020-08-16 NOTE — OB NEONATOLOGY/PEDIATRICIAN DELIVERY SUMMARY - NSPEDSNEONOTESA_OBGYN_ALL_OB_FT
Baby is a 35+? week male born to a 25 year old  via emergency CS for ?abruption. Mother had history of previous classical CS (@26 weeks, breech, PPROM, in 2017). No additional maternal medical history, no maternal allergies. No known maternal PNL or other pregnancy-related concerns at this time. GBS unknown. No to circ. No details known about feeding, HepB vaccination known at this time. Baby is a 35+? week male born to a 25 year old  via emergency CS for ?abruption. Mother had history of previous classical CS (@26 weeks, breech, PPROM, in 2017). No additional maternal medical history, no maternal allergies. No known maternal PNL or other pregnancy-related concerns at this time. GBS unknown.     AROM at delivery, with copious blood. Baby was born with poor tone, cry. Baby spit up fluid immediately after birth. Cord was clamped/cut immediately. Brought to warmer. Warmed, dried, stimulated. Baby had some improvement in color, cry but required continuous stimulation bulb and deep/tracheal suctioning. At approx 4.5 MOL began CPAP @5ccH2O for retractions/grunting. CPAP was raised to 6 then 7cmH2O. At Approx 14 MOL patient was switched to CPAP via nasal prong in preparation for NICU transport. APGARs 5/7/8. Baby transferred to NICU for further support and management. Insufficient information to calculate EOS at this time.     No to circ. No details known about feeding, HepB vaccination known at this time.

## 2020-08-16 NOTE — LACTATION INITIAL EVALUATION - INTERVENTION OUTCOME
Reviewed use of double electric breastpump. Safe collection, handling and storage of breast milk discussed. Safe cleansing of breast pump parts reviewed.

## 2020-08-16 NOTE — ED POST DISCHARGE NOTE - REASON FOR FOLLOW-UP
Other Patient sent to L&D. Faxed all labs done in ED to Dr Dexter Howell fax # [phone] phone # [phone]. fax confirmation received.

## 2020-08-16 NOTE — CHART NOTE - NSCHARTNOTEFT_GEN_A_CORE
M Fellow  Patient seen at bedside w/ M attending Dr. Murphy. She reports feeling chest discomfort that she believes is related to her persistent nausea/vomiting. She has not ambulated out of bed since surgery. Review of VS WNL, patient requiring pressor support, UOP excellent, Hb stable however repeat pending now. She is s/p emergency repeat c/s due to uterine rupture c/b PPH w/ EBL 3500 s/p 3u PRBC and 1u FFP, currently appears stable w/ pressor requirements decreasing, would suggest holding off on heparin prophylaxis until CBC results.   Appreciate excellent SICU care

## 2020-08-16 NOTE — ED PROVIDER NOTE - PROGRESS NOTE DETAILS
Ob called by me from triage d/t hypotension and pain, arrived quickly and after brief eval, took pt to OR.

## 2020-08-16 NOTE — PATIENT PROFILE ADULT - BRADEN MOISTURE
Use your celebrex for pain.  I will send a message to Dr. Bird about your pain plan.    Ice the affected area for comfort.  Gentle stretches and movements keep the muscles warm and loose.    If you have any Workers' Compensation questions or needs, please call:  PIPO Katz in Workers' Compensation Case Management at [phone].      (4) rarely moist

## 2020-08-16 NOTE — OB RN DELIVERY SUMMARY - NS_SEPSISRSKCALC_OBGYN_ALL_OB_FT
GBS status in the 'Prenatal Lab tests/results section' on the OB RN Patient Profile must be documented. EOS calculated successfully. EOS Risk Factor: 0.5/1000 live births ([facility] national incidence); GA=35w2d; Temp=97.9; ROM=0; GBS='Unknown'; Antibiotics='Broad spectrum antibiotics > 4 hrs prior to birth'

## 2020-08-17 LAB
ALBUMIN SERPL ELPH-MCNC: 2.8 G/DL — LOW (ref 3.3–5)
ALP SERPL-CCNC: 105 U/L — SIGNIFICANT CHANGE UP (ref 40–120)
ALT FLD-CCNC: 12 U/L — SIGNIFICANT CHANGE UP (ref 4–33)
ANION GAP SERPL CALC-SCNC: 12 MMO/L — SIGNIFICANT CHANGE UP (ref 7–14)
AST SERPL-CCNC: 24 U/L — SIGNIFICANT CHANGE UP (ref 4–32)
BILIRUB SERPL-MCNC: 0.5 MG/DL — SIGNIFICANT CHANGE UP (ref 0.2–1.2)
BUN SERPL-MCNC: 4 MG/DL — LOW (ref 7–23)
CALCIUM SERPL-MCNC: 9 MG/DL — SIGNIFICANT CHANGE UP (ref 8.4–10.5)
CHLORIDE SERPL-SCNC: 103 MMOL/L — SIGNIFICANT CHANGE UP (ref 98–107)
CO2 SERPL-SCNC: 24 MMOL/L — SIGNIFICANT CHANGE UP (ref 22–31)
CREAT SERPL-MCNC: 0.4 MG/DL — LOW (ref 0.5–1.3)
GLUCOSE SERPL-MCNC: 104 MG/DL — HIGH (ref 70–99)
HCT VFR BLD CALC: 32.6 % — LOW (ref 34.5–45)
HGB BLD-MCNC: 10.7 G/DL — LOW (ref 11.5–15.5)
MAGNESIUM SERPL-MCNC: 1.6 MG/DL — SIGNIFICANT CHANGE UP (ref 1.6–2.6)
MCHC RBC-ENTMCNC: 27.6 PG — SIGNIFICANT CHANGE UP (ref 27–34)
MCHC RBC-ENTMCNC: 32.8 % — SIGNIFICANT CHANGE UP (ref 32–36)
MCV RBC AUTO: 84.2 FL — SIGNIFICANT CHANGE UP (ref 80–100)
NRBC # FLD: 0 K/UL — SIGNIFICANT CHANGE UP (ref 0–0)
PHOSPHATE SERPL-MCNC: 3.5 MG/DL — SIGNIFICANT CHANGE UP (ref 2.5–4.5)
PLATELET # BLD AUTO: 112 K/UL — LOW (ref 150–400)
PMV BLD: 13 FL — SIGNIFICANT CHANGE UP (ref 7–13)
POTASSIUM SERPL-MCNC: 3.7 MMOL/L — SIGNIFICANT CHANGE UP (ref 3.5–5.3)
POTASSIUM SERPL-SCNC: 3.7 MMOL/L — SIGNIFICANT CHANGE UP (ref 3.5–5.3)
PROT SERPL-MCNC: 5.4 G/DL — LOW (ref 6–8.3)
RBC # BLD: 3.87 M/UL — SIGNIFICANT CHANGE UP (ref 3.8–5.2)
RBC # FLD: 16 % — HIGH (ref 10.3–14.5)
SODIUM SERPL-SCNC: 139 MMOL/L — SIGNIFICANT CHANGE UP (ref 135–145)
T PALLIDUM AB TITR SER: NEGATIVE — SIGNIFICANT CHANGE UP
WBC # BLD: 13.03 K/UL — HIGH (ref 3.8–10.5)
WBC # FLD AUTO: 13.03 K/UL — HIGH (ref 3.8–10.5)

## 2020-08-17 PROCEDURE — 99291 CRITICAL CARE FIRST HOUR: CPT

## 2020-08-17 PROCEDURE — 71045 X-RAY EXAM CHEST 1 VIEW: CPT | Mod: 26

## 2020-08-17 RX ORDER — PANTOPRAZOLE SODIUM 20 MG/1
40 TABLET, DELAYED RELEASE ORAL DAILY
Refills: 0 | Status: DISCONTINUED | OUTPATIENT
Start: 2020-08-17 | End: 2020-08-22

## 2020-08-17 RX ORDER — ACETAMINOPHEN 500 MG
975 TABLET ORAL EVERY 6 HOURS
Refills: 0 | Status: DISCONTINUED | OUTPATIENT
Start: 2020-08-17 | End: 2020-08-22

## 2020-08-17 RX ORDER — DEXTROSE MONOHYDRATE, SODIUM CHLORIDE, AND POTASSIUM CHLORIDE 50; .745; 4.5 G/1000ML; G/1000ML; G/1000ML
1000 INJECTION, SOLUTION INTRAVENOUS
Refills: 0 | Status: DISCONTINUED | OUTPATIENT
Start: 2020-08-17 | End: 2020-08-17

## 2020-08-17 RX ORDER — IBUPROFEN 200 MG
600 TABLET ORAL EVERY 6 HOURS
Refills: 0 | Status: DISCONTINUED | OUTPATIENT
Start: 2020-08-17 | End: 2020-08-22

## 2020-08-17 RX ORDER — POTASSIUM CHLORIDE 20 MEQ
20 PACKET (EA) ORAL ONCE
Refills: 0 | Status: COMPLETED | OUTPATIENT
Start: 2020-08-17 | End: 2020-08-17

## 2020-08-17 RX ORDER — MAGNESIUM SULFATE 500 MG/ML
2 VIAL (ML) INJECTION ONCE
Refills: 0 | Status: COMPLETED | OUTPATIENT
Start: 2020-08-17 | End: 2020-08-17

## 2020-08-17 RX ORDER — PANTOPRAZOLE SODIUM 20 MG/1
40 TABLET, DELAYED RELEASE ORAL DAILY
Refills: 0 | Status: DISCONTINUED | OUTPATIENT
Start: 2020-08-17 | End: 2020-08-17

## 2020-08-17 RX ORDER — SODIUM CHLORIDE 9 MG/ML
1000 INJECTION, SOLUTION INTRAVENOUS
Refills: 0 | Status: DISCONTINUED | OUTPATIENT
Start: 2020-08-17 | End: 2020-08-18

## 2020-08-17 RX ORDER — HEPARIN SODIUM 5000 [USP'U]/ML
5000 INJECTION INTRAVENOUS; SUBCUTANEOUS EVERY 12 HOURS
Refills: 0 | Status: DISCONTINUED | OUTPATIENT
Start: 2020-08-17 | End: 2020-08-22

## 2020-08-17 RX ORDER — OXYCODONE HYDROCHLORIDE 5 MG/1
5 TABLET ORAL EVERY 4 HOURS
Refills: 0 | Status: DISCONTINUED | OUTPATIENT
Start: 2020-08-17 | End: 2020-08-22

## 2020-08-17 RX ORDER — SIMETHICONE 80 MG/1
80 TABLET, CHEWABLE ORAL EVERY 4 HOURS
Refills: 0 | Status: DISCONTINUED | OUTPATIENT
Start: 2020-08-17 | End: 2020-08-22

## 2020-08-17 RX ADMIN — Medication 975 MILLIGRAM(S): at 11:59

## 2020-08-17 RX ADMIN — DEXTROSE MONOHYDRATE, SODIUM CHLORIDE, AND POTASSIUM CHLORIDE 75 MILLILITER(S): 50; .745; 4.5 INJECTION, SOLUTION INTRAVENOUS at 12:06

## 2020-08-17 RX ADMIN — Medication 20 MILLIEQUIVALENT(S): at 06:27

## 2020-08-17 RX ADMIN — Medication 975 MILLIGRAM(S): at 12:28

## 2020-08-17 RX ADMIN — SIMETHICONE 80 MILLIGRAM(S): 80 TABLET, CHEWABLE ORAL at 20:55

## 2020-08-17 RX ADMIN — HEPARIN SODIUM 5000 UNIT(S): 5000 INJECTION INTRAVENOUS; SUBCUTANEOUS at 08:21

## 2020-08-17 RX ADMIN — Medication 975 MILLIGRAM(S): at 20:55

## 2020-08-17 RX ADMIN — OXYCODONE HYDROCHLORIDE 5 MILLIGRAM(S): 5 TABLET ORAL at 01:03

## 2020-08-17 RX ADMIN — Medication 400 MILLIGRAM(S): at 01:00

## 2020-08-17 RX ADMIN — Medication 600 MILLIGRAM(S): at 17:25

## 2020-08-17 RX ADMIN — Medication 975 MILLIGRAM(S): at 21:45

## 2020-08-17 RX ADMIN — SODIUM CHLORIDE 100 MILLILITER(S): 9 INJECTION, SOLUTION INTRAVENOUS at 17:47

## 2020-08-17 RX ADMIN — OXYCODONE HYDROCHLORIDE 5 MILLIGRAM(S): 5 TABLET ORAL at 04:30

## 2020-08-17 RX ADMIN — Medication 975 MILLIGRAM(S): at 05:27

## 2020-08-17 RX ADMIN — Medication 0.2 MILLIGRAM(S): at 03:31

## 2020-08-17 RX ADMIN — OXYCODONE HYDROCHLORIDE 5 MILLIGRAM(S): 5 TABLET ORAL at 00:33

## 2020-08-17 RX ADMIN — HEPARIN SODIUM 5000 UNIT(S): 5000 INJECTION INTRAVENOUS; SUBCUTANEOUS at 20:55

## 2020-08-17 RX ADMIN — OXYCODONE HYDROCHLORIDE 5 MILLIGRAM(S): 5 TABLET ORAL at 04:00

## 2020-08-17 RX ADMIN — Medication 50 GRAM(S): at 06:27

## 2020-08-17 RX ADMIN — SIMETHICONE 80 MILLIGRAM(S): 80 TABLET, CHEWABLE ORAL at 17:25

## 2020-08-17 RX ADMIN — SIMETHICONE 80 MILLIGRAM(S): 80 TABLET, CHEWABLE ORAL at 12:06

## 2020-08-17 NOTE — PROGRESS NOTE ADULT - ASSESSMENT
25yF w/ hx of ,  35w2d s/p emergency  and uterine repair for uterine rupture. Transferred to SICU for hemodynamic in the setting of hypotension requiring phenylephrine.    PLAN:  NEURO:  - awake, alert, not on sedation  - pain control with tylenol, oxycodone; no toradol    RESPIRATORY:   - satting well on RA  - CXR for pleuritic CP  no infiltrates.     CARDIOVASCULAR:  - on bouchra, wean pressor as tolerated. Currently on 0.2   - EKG for pleuritic CP . NSR     GI/NUTRITION:  - NPO    GENITOURINARY/RENAL: hx , uterine rupture  - monitor UOP  - LR @ 125  - F/u PM BMP    HEMATOLOGIC: EBL 3.5L, s/p 3u pRBC  - monitor h&h; f/u postop labs  - q6 CBC  - transfuse as needed  - hold dvt ppx  - on Methergine, Pitocin for utertonic in setting of uterine rupture/hemorrhage    - F/u PM CBC    INFECTIOUS DISEASE:  - no active issue    ENDOCRINE:  - no active issue  - q6h fs while NPO    DISPO:  - SICU 25yF w/ hx of ,  35w2d s/p emergency  and uterine repair for uterine rupture. Transferred to SICU for hemodynamic in the setting of hypotension requiring phenylephrine.    PLAN:  NEURO:  - awake, alert, not on sedation  - pain control with tylenol, oxycodone; no toradol    RESPIRATORY:   - satting well on RA  - CXR for pleuritic CP  no infiltrates.     CARDIOVASCULAR:  - on bouchra, wean pressor as tolerated. Currently on 0.2   - EKG for pleuritic CP . NSR     GI/NUTRITION:  - NPO in setting of ileus  - Magnesium PRN for constipation  - simethicone PRN   - consider putting in NGT for ileus    GENITOURINARY/RENAL: hx , uterine rupture  - Discuss buitrago necessity w primary team; monitor UOP   - LR @ 125 --> D5 1/2NS @ 75  - F/u PM BMP     HEMATOLOGIC: EBL 3.5L, s/p 3u pRBC   - monitor h&h; f/u postop labs   - q6 CBC   - transfuse as needed   - continue dvt ppx   - Methergine, Pitocin for utertonic in setting of uterine rupture/hemorrhage    - F/u PM CBC     INFECTIOUS DISEASE:   - no active issue     ENDOCRINE:   - no active issue   - q6h fs while NPO     DISPO: SICU  - SICU 25yF w/ hx of ,  35w2d s/p emergency  and uterine repair for uterine rupture. Transferred to SICU for hemodynamic in the setting of hypotension requiring phenylephrine.    PLAN:  NEURO:  - awake, alert, not on sedation  - pain control with tylenol, oxycodone; no toradol    RESPIRATORY:   - satting well on RA  - CXR for pleuritic CP  no infiltrates.     CARDIOVASCULAR:  - on bouchra, wean pressor as tolerated. Currently on 0.2   - EKG for pleuritic CP . NSR     GI/NUTRITION:  - NPO in setting of ileus  - NGT placed for gastric distention, later removed  - started protonix  - Magnesium PRN for constipation  - simethicone PRN     GENITOURINARY/RENAL: hx , uterine rupture  - dc buitrago; monitor UOP   - LR @ 125 --> D5 1/2NS @ 75  - F/u PM BMP     HEMATOLOGIC: EBL 3.5L, s/p 3u pRBC   - monitor h&h; f/u postop labs   - dc pitocin  - transfuse as needed   - continue dvt ppx   - Methergine for utertonic in setting of uterine rupture/hemorrhage    - F/u PM CBC     INFECTIOUS DISEASE:   - no active issue     ENDOCRINE:   - no active issue   - q6h fs while NPO     DISPO: SICU  - SICU

## 2020-08-17 NOTE — PROGRESS NOTE ADULT - SUBJECTIVE AND OBJECTIVE BOX
Post Op C/S, ruptured uterus      Pt c/o epigastric and incisional pain      Vital Signs Last 24 Hrs  T(C): 37.7 (17 Aug 2020 07:44), Max: 37.7 (17 Aug 2020 07:44)  T(F): 99.8 (17 Aug 2020 07:44), Max: 99.8 (17 Aug 2020 07:44)  HR: 106 (17 Aug 2020 11:00) (81 - 112)  BP: 111/62 (17 Aug 2020 11:00) (93/61 - 117/55)  BP(mean): 73 (17 Aug 2020 11:00) (63 - 92)  RR: 21 (17 Aug 2020 11:00) (14 - 24)  SpO2: 97% (17 Aug 2020 11:00) (95% - 100%)  MEDICATIONS  (STANDING):  acetaminophen   Tablet .. 975 milliGRAM(s) Oral every 6 hours  dextrose 5% + sodium chloride 0.45% with potassium chloride 20 mEq/L 1000 milliLiter(s) (75 mL/Hr) IV Continuous <Continuous>  diphtheria/tetanus/pertussis (acellular) Vaccine (ADAcel) 0.5 milliLiter(s) IntraMuscular once  heparin   Injectable 5000 Unit(s) SubCutaneous every 8 hours  pantoprazole  Injectable 40 milliGRAM(s) IV Push daily    MEDICATIONS  (PRN):  diphenhydrAMINE 25 milliGRAM(s) Oral every 6 hours PRN Itching  lanolin Ointment 1 Application(s) Topical every 6 hours PRN Sore Nipples  oxyCODONE    IR 5 milliGRAM(s) Oral every 3 hours PRN Moderate to Severe Pain (4-10)        Abdomen soft, sl distended  fundus firm  Incision clean dry and intact  extremities non tender  lochia wnl                          10.7   13.03 )-----------( 112      ( 17 Aug 2020 04:25 )             32.6     Patient doing well    Routine post operative care as per SICU team

## 2020-08-17 NOTE — PROGRESS NOTE ADULT - ATTENDING COMMENTS
The patient was seen and examined, chart and notes reviewed.  The current diagnosis, plan of care and alternatives have been discussed with the patient.  All questions have been answered and updates have been discussed.  The case was discussed with team residents/PA's and medical students at morning  surgical rounds and throughout the course of the day.    Acute posthemorrhage anemia  a.  Stable  b.  No further transfusions at this time    Hypotension  a.  Wean off neosynephrine gtt this am  b.  Remains tachycardic  c.  Would discontinue buitrago catheter    At risk for malnutrition/Gastric ileus  a.  Tolerating clears, complains of epigastric pain/fullness  b.  Large stomach bubble noted on CXR, NGT inserted, px requested dc  c.  Give protonix x 1 The patient was seen and examined, chart and notes reviewed.  The current diagnosis, plan of care and alternatives have been discussed with the patient.  All questions have been answered and updates have been discussed.  The case was discussed with team residents/PA's and medical students at morning  surgical rounds and throughout the course of the day.    Acute posthemorrhage anemia  a.  Stable  b.  No further transfusions at this time    Hypotension  a.  Wean off neosynephrine gtt this am  b.  Remains tachycardic  c.  Would discontinue buitrago catheter    At risk for malnutrition/Gastric ileus  a.  Tolerating clears, complains of epigastric pain/fullness, make NPO  b.  Large stomach bubble noted on CXR, NGT inserted, px requested dc  c.  Give protonix x 1

## 2020-08-17 NOTE — PROGRESS NOTE ADULT - SUBJECTIVE AND OBJECTIVE BOX
J SICU Progress Note    24 HOUR EVENTS:    ALLERGIES:  No Known Allergies      VITAL SIGNS:  ICU Vital Signs Last 24 Hrs  T(C): 37.3 (17 Aug 2020 04:00), Max: 37.3 (17 Aug 2020 04:00)  T(F): 99.2 (17 Aug 2020 04:00), Max: 99.2 (17 Aug 2020 04:00)  HR: 102 (17 Aug 2020 05:00) (79 - 102)  BP: 99/57 (17 Aug 2020 05:00) (86/67 - 126/71)  BP(mean): 66 (17 Aug 2020 05:00) (62 - 92)  ABP: --  ABP(mean): --  RR: 18 (17 Aug 2020 05:00) (12 - 24)  SpO2: 99% (17 Aug 2020 05:00) (97% - 100%)      NEUROLOGY:  Exam: A&Ox  Pain Control:   acetaminophen   Tablet .. 975 milliGRAM(s) Oral every 6 hours  diphenhydrAMINE 25 milliGRAM(s) Oral every 6 hours PRN Itching  oxyCODONE    IR 5 milliGRAM(s) Oral once PRN Moderate to Severe Pain (4-10)  oxyCODONE    IR 5 milliGRAM(s) Oral every 3 hours PRN Moderate to Severe Pain (4-10)      [X] Adequacy of sedation and pain control has been assessed and adjusted    RESPIRATORY:  Exam:     Mechanical Ventilation:   ABG - ( 16 Aug 2020 06:45 )  pH: 7.35  /  pCO2: 39    /  pO2: 99    / HCO3: 21    / Base Excess: -3.9  /  SaO2: 97.7    Lactate: 2.7                CARDIOVASCULAR  Exam:     VBG - ( 16 Aug 2020 04:13 )  pH: 7.29  /  pCO2: 53    /  pO2: 25    / HCO3: 21    / Base Excess: -1.1  /  SaO2: 33.3   Lactate: 3.0              Echo:     GI:  Exam:   Diet:   magnesium hydroxide Suspension 30 milliLiter(s) Oral two times a day PRN Constipation  simethicone 80 milliGRAM(s) Chew every 4 hours PRN Gas      :  lactated ringers. 1000 milliLiter(s) IV Continuous <Continuous>  magnesium sulfate  IVPB 2 Gram(s) IV Intermittent once  oxytocin Infusion 333.333 milliUNIT(s)/Min IV Continuous <Continuous>  potassium chloride    Tablet ER 20 milliEquivalent(s) Oral once    I&O's Detail    08-15 @ 07:01 - 08-16 @ 07:00  --------------------------------------------------------  IN:    Other: 1500 mL  Total IN: 1500 mL    OUT:    Estimated Blood Loss: 3500 mL    Indwelling Catheter - Urethral: 600 mL  Total OUT: 4100 mL    Total NET: -2600 mL      08-16 @ 07:01 - 08-17 @ 05:34  --------------------------------------------------------  IN:    IV PiggyBack: 400 mL    lactated ringers.: 2200 mL    Oral Fluid: 1230 mL    oxytocin Infusion: 750 mL    phenylephrine   Infusion: 21 mL  Total IN: 4601 mL    OUT:    Indwelling Catheter - Urethral: 2785 mL  Total OUT: 2785 mL    Total NET: 1816 mL          08-17    139  |  103  |  4<L>  ----------------------------<  104<H>  3.7   |  24  |  0.40<L>    Ca    9.0      17 Aug 2020 04:25  Phos  3.5     08-17  Mg     1.6     08-17    TPro  5.4<L>  /  Alb  2.8<L>  /  TBili  0.5  /  DBili  x   /  AST  24  /  ALT  12  /  AlkPhos  105  08-17    Bay catheter indication:    HEMATOLOGIC:  heparin   Injectable 5000 Unit(s) SubCutaneous every 8 hours    [ ] DVT Prophylaxis:                        10.7   13.03 )-----------( 112      ( 17 Aug 2020 04:25 )             32.6     PT/INR - ( 16 Aug 2020 06:45 )   PT: 12.6 SEC;   INR: 1.11          PTT - ( 16 Aug 2020 06:45 )  PTT:26.1 SEC  Transfusions: [ ] none	 [ ] PRBC	  [ ] Platelets	    [ ] FFP		[ ] Cryoprecipitate  Comments:    INFECTIOUS DISEASE:  diphtheria/tetanus/pertussis (acellular) Vaccine (ADAcel) 0.5 milliLiter(s) IntraMuscular once    RECENT CULTURES:      ENDOCRINE:    CAPILLARY BLOOD GLUCOSE          OTHER MEDICATIONS:  lanolin Ointment 1 Application(s) Topical every 6 hours PRN      IMAGING STUDIES: [facility] SICU Progress Note    HPI:  REYNA BARNES is a 25y Female hx of ,  35w2d presents with severe abdominal pain with hypotension to 80s/50s, concerning for uterine rupture. Patient was taken to OR for emergency  and uterine repair. Intraop, patient hypotensive requiring phenylephrine. Intraop hemostasis achieved with methergine, pitocin, TXA, b-oneal suture. (EBL 3.5L, 1.5L crystalloid, 3u pRBC, 1FFP). SICU consulted for postop hypotension  2/2 hemorrhagic shock requiring vasopressor    24 HOUR EVENTS:  - Weaned off pressor support, SBP at baseline 90  - EKG for chest pain exhibited normal sinus rhythm  - H/H stable     ALLERGIES:  No Known Allergies      VITAL SIGNS:  ICU Vital Signs Last 24 Hrs  T(C): 37.3 (17 Aug 2020 04:00), Max: 37.3 (17 Aug 2020 04:00)  T(F): 99.2 (17 Aug 2020 04:00), Max: 99.2 (17 Aug 2020 04:00)  HR: 102 (17 Aug 2020 05:00) (79 - 102)  BP: 99/57 (17 Aug 2020 05:00) (86/67 - 126/71)  BP(mean): 66 (17 Aug 2020 05:00) (62 - 92)  ABP: --  ABP(mean): --  RR: 18 (17 Aug 2020 05:00) (12 - 24)  SpO2: 99% (17 Aug 2020 05:00) (97% - 100%)      NEUROLOGY:  Exam: A&Ox  Pain Control:   acetaminophen   Tablet .. 975 milliGRAM(s) Oral every 6 hours  diphenhydrAMINE 25 milliGRAM(s) Oral every 6 hours PRN Itching  oxyCODONE    IR 5 milliGRAM(s) Oral once PRN Moderate to Severe Pain (4-10)  oxyCODONE    IR 5 milliGRAM(s) Oral every 3 hours PRN Moderate to Severe Pain (4-10)      [X] Adequacy of sedation and pain control has been assessed and adjusted    RESPIRATORY:  Exam:     Mechanical Ventilation:   ABG - ( 16 Aug 2020 06:45 )  pH: 7.35  /  pCO2: 39    /  pO2: 99    / HCO3: 21    / Base Excess: -3.9  /  SaO2: 97.7    Lactate: 2.7                CARDIOVASCULAR  Exam:     VBG - ( 16 Aug 2020 04:13 )  pH: 7.29  /  pCO2: 53    /  pO2: 25    / HCO3: 21    / Base Excess: -1.1  /  SaO2: 33.3   Lactate: 3.0              Echo:     GI:  Exam:   Diet:   magnesium hydroxide Suspension 30 milliLiter(s) Oral two times a day PRN Constipation  simethicone 80 milliGRAM(s) Chew every 4 hours PRN Gas      :  lactated ringers. 1000 milliLiter(s) IV Continuous <Continuous>  magnesium sulfate  IVPB 2 Gram(s) IV Intermittent once  oxytocin Infusion 333.333 milliUNIT(s)/Min IV Continuous <Continuous>  potassium chloride    Tablet ER 20 milliEquivalent(s) Oral once    I&O's Detail    08-15 @ 07:  -   @ 07:00  --------------------------------------------------------  IN:    Other: 1500 mL  Total IN: 1500 mL    OUT:    Estimated Blood Loss: 3500 mL    Indwelling Catheter - Urethral: 600 mL  Total OUT: 4100 mL    Total NET: -2600 mL       @ 07:  -   @ 05:34  --------------------------------------------------------  IN:    IV PiggyBack: 400 mL    lactated ringers.: 2200 mL    Oral Fluid: 1230 mL    oxytocin Infusion: 750 mL    phenylephrine   Infusion: 21 mL  Total IN: 4601 mL    OUT:    Indwelling Catheter - Urethral: 2785 mL  Total OUT: 2785 mL    Total NET: 1816 mL              139  |  103  |  4<L>  ----------------------------<  104<H>  3.7   |  24  |  0.40<L>    Ca    9.0      17 Aug 2020 04:25  Phos  3.5       Mg     1.6         TPro  5.4<L>  /  Alb  2.8<L>  /  TBili  0.5  /  DBili  x   /  AST  24  /  ALT  12  /  AlkPhos  105  -    Bay catheter indication:    HEMATOLOGIC:  heparin   Injectable 5000 Unit(s) SubCutaneous every 8 hours    [ ] DVT Prophylaxis:                        10.7   13.03 )-----------( 112      ( 17 Aug 2020 04:25 )             32.6     PT/INR - ( 16 Aug 2020 06:45 )   PT: 12.6 SEC;   INR: 1.11          PTT - ( 16 Aug 2020 06:45 )  PTT:26.1 SEC  Transfusions: [ ] none	 [ ] PRBC	  [ ] Platelets	    [ ] FFP		[ ] Cryoprecipitate  Comments:    INFECTIOUS DISEASE:  diphtheria/tetanus/pertussis (acellular) Vaccine (ADAcel) 0.5 milliLiter(s) IntraMuscular once    RECENT CULTURES:      ENDOCRINE:    CAPILLARY BLOOD GLUCOSE          OTHER MEDICATIONS:  lanolin Ointment 1 Application(s) Topical every 6 hours PRN      IMAGING STUDIES:

## 2020-08-17 NOTE — PROGRESS NOTE ADULT - ASSESSMENT
A/P: 24y/o , h/o classical  section at 26wks, now POD1 s/p an emergent LTCS @ 35+2 c/b and B-oneal sutures, and hemorrhagic shock requiring pressor support intra-op. EBL 3500ml. Massive transfusion protocol initiated, and patient received 3upRBCs/1u FFP. Patient brought to the SICU for post-op care. POD0 complained of pleuritic chest pain; EKG NSR, troponins downtrended, CXR wnl. Patient recovering well post-operatively and hemodynamically stable.    Neuro: Pain well controlled, continue Tylenol standing and oxy prn.   CV: s/p phenylephrine drip, hemodynamically stable  Pulm: Saturating well on room air, encourage incentive spirometer, oob/amb when possible. Patient c/o pleuritic chest pain, CXR wnl, Troponin 23->11, ekg wnl. Chest discomfort reproducible on exam, likely musculoskeletal in origin.   GI: Advance to regular diet per SICU.  : Bay in place. UOP adequate, continue strict Is/Os.  Heme: SCDs and HSQ for DVT ppx  FEN: LR@125. Replete electrolytes prn.  ID: Afebrile.   Endo: No active issues.   Dispo: appreciate excellent SICU care    DAVID Morales PGY3 A/P: 26y/o , h/o classical  section at 26wks, now POD1 s/p an emergent LTCS @ 35+2 c/b and B-oneal sutures, and hemorrhagic shock requiring pressor support intra-op. EBL 3500ml. Massive transfusion protocol initiated, and patient received 3upRBCs/1u FFP. Patient brought to the SICU for post-op care. POD0 complained of pleuritic chest pain; EKG NSR, troponins downtrended, CXR wnl. Pain now resolved. Patient recovering well post-operatively and hemodynamically stable.    Neuro: Pain well controlled, continue Tylenol standing and oxy prn.   CV: s/p phenylephrine drip, hemodynamically stable  Pulm: Saturating well on room air, encourage incentive spirometer, oob/amb when possible. Pleuritic chest pain now resolved.  GI: Advance to regular diet per SICU.  : Buitrago in place. UOP adequate, continue strict Is/Os.  Heme: SCDs and HSQ for DVT ppx  FEN: LR@125. Replete electrolytes prn.  ID: Afebrile.   Dispo: appreciate excellent SICU care. Likely transfer to floor this AM. Consider d/c buitrago if AM labs stable.    H Andrew PGY3  Dr. Rosado-Trevin aware A/P: 24y/o , h/o classical  section at 26wks, now POD1 s/p an emergent LTCS @ 35+2 c/b and B-oneal sutures, and hemorrhagic shock requiring pressor support intra-op. EBL 3500ml. Massive transfusion protocol initiated, and patient received 3upRBCs/1u FFP. Patient brought to the SICU for post-op care. POD0 complained of pleuritic chest pain; EKG NSR, troponins downtrended, CXR wnl. Pain now resolved. Patient recovering well post-operatively and hemodynamically stable.    Neuro: Pain well controlled, continue Tylenol standing and oxy prn.   CV: s/p phenylephrine drip, hemodynamically stable  Pulm: Saturating well on room air, encourage incentive spirometer, oob/amb when possible. Pleuritic chest pain now resolved.  GI: Advance to regular diet per SICU.  : Buitrago in place. UOP adequate, continue strict Is/Os.  Heme: SCDs and HSQ for DVT ppx. AM labs pending.  FEN: LR@125. Replete electrolytes prn.  ID: Afebrile.   Dispo: appreciate excellent SICU care. Likely transfer to floor this AM. Consider d/c buitrago if AM labs stable.    H Andrew PGY3  Dr. Gabriel aware

## 2020-08-17 NOTE — PROGRESS NOTE ADULT - SUBJECTIVE AND OBJECTIVE BOX
PAtient seen and examined.  Patient is a 25y old  Female who was at 35 weeks gestation and had uterine rupture of a classical  scar   Currently  she is PPD  1 and complaining of lower aabdomen pain  PAST MEDICAL & SURGICAL HISTORY:  No pertinent past medical history   delivery delivered: 2017 at 25 weeks gestation male PTL [place] breech      MEDICATIONS  (STANDING):  acetaminophen   Tablet .. 975 milliGRAM(s) Oral every 6 hours  dextrose 5% + sodium chloride 0.45% with potassium chloride 20 mEq/L 1000 milliLiter(s) (75 mL/Hr) IV Continuous <Continuous>  diphtheria/tetanus/pertussis (acellular) Vaccine (ADAcel) 0.5 milliLiter(s) IntraMuscular once  heparin   Injectable 5000 Unit(s) SubCutaneous every 8 hours  pantoprazole  Injectable 40 milliGRAM(s) IV Push daily              Vital Signs Last 24 Hrs  T(C): 37.7 (17 Aug 2020 07:44), Max: 37.7 (17 Aug 2020 07:44)  T(F): 99.8 (17 Aug 2020 07:44), Max: 99.8 (17 Aug 2020 07:44)  HR: 101 (17 Aug 2020 12:00) (83 - 112)  BP: 109/58 (17 Aug 2020 12:00) (95/76 - 117/55)  BP(mean): 68 (17 Aug 2020 12:00) (63 - 92)  RR: 22 (17 Aug 2020 12:00) (14 - 24)  SpO2: 98% (17 Aug 2020 12:00) (95% - 100%)    PHYSICAL EXAM:  NO abdominal guarding or rigidity.  Abdomen is soft with normal bowel sounds  No significant vaginal bleeding at this time  Currently is being managed by SICU and appears to be improving  Will continue to follow   pain management  Breast pump      I&O's Summary    16 Aug 2020 07:  -  17 Aug 2020 07:00  --------------------------------------------------------  IN: 5731 mL / OUT: 3240 mL / NET: 2491 mL    17 Aug 2020 07:01  -  17 Aug 2020 13:01  --------------------------------------------------------  IN: 425 mL / OUT: 600 mL / NET: -175 mL        LABORATORY:                        10.7   13.03 )-----------( 112      ( 17 Aug 2020 04:25 )             32.6     08-17    139  |  103  |  4<L>  ----------------------------<  104<H>  3.7   |  24  |  0.40<L>    Ca    9.0      17 Aug 2020 04:25  Phos  3.5     08-17  Mg     1.6     08-17    TPro  5.4<L>  /  Alb  2.8<L>  /  TBili  0.5  /  DBili  x   /  AST  24  /  ALT  12  /  AlkPhos  105  08-17    PT/INR - ( 16 Aug 2020 06:45 )   PT: 12.6 SEC;   INR: 1.11          PTT - ( 16 Aug 2020 06:45 )  PTT:26.1 SEC

## 2020-08-17 NOTE — PROGRESS NOTE ADULT - SUBJECTIVE AND OBJECTIVE BOX
R3 Antepartum Note, HD#2    Patient seen and examined at bedside, no acute overnight events. No acute complaints. Pt reports denies HA, epigastric pain, blurred vision, CP, SOB, N/V, fevers, and chills. Bay in place.    Vital Signs Last 24 Hours  T(C): 37.1 (08-17-20 @ 00:00), Max: 37.1 (08-17-20 @ 00:00)  HR: 90 (08-17-20 @ 02:00) (79 - 115)  BP: 104/52 (08-17-20 @ 02:00) (86/67 - 126/71)  RR: 15 (08-17-20 @ 02:00) (12 - 24)  SpO2: 97% (08-17-20 @ 02:00) (97% - 100%)          Physical Exam:  General: NAD  Abdomen: Soft, non-tender, gravid  Ext: No pain or swelling      Labs:             11.7   16.37 )-----------( 120      ( 08-16 @ 13:00 )             34.3     08-16 @ 06:45    137  |  106  |  6   ----------------------------<  126  4.0   |  19  |  0.34    Ca    8.6      08-16 @ 06:45  Phos  2.7     08-16 @ 06:45  Mg     1.5     08-16 @ 06:45    TPro  5.4  /  Alb  3.1  /  TBili  0.4  /  DBili  x   /  AST  20  /  ALT  10  /  AlkPhos  119  08-16 @ 06:45    PT/INR - ( 08-16 @ 06:45 )   PT: 12.6 SEC;   INR: 1.11     PTT - ( 08-16 @ 06:45 )  PTT:26.1 SEC        MEDICATIONS  (STANDING):  acetaminophen   Tablet .. 975 milliGRAM(s) Oral every 6 hours  diphtheria/tetanus/pertussis (acellular) Vaccine (ADAcel) 0.5 milliLiter(s) IntraMuscular once  heparin   Injectable 5000 Unit(s) SubCutaneous every 8 hours  lactated ringers. 1000 milliLiter(s) (125 mL/Hr) IV Continuous <Continuous>  methylergonovine 0.2 milliGRAM(s) Oral every 4 hours  oxytocin Infusion 333.333 milliUNIT(s)/Min (1000 mL/Hr) IV Continuous <Continuous>    MEDICATIONS  (PRN):  diphenhydrAMINE 25 milliGRAM(s) Oral every 6 hours PRN Itching  lanolin Ointment 1 Application(s) Topical every 6 hours PRN Sore Nipples  magnesium hydroxide Suspension 30 milliLiter(s) Oral two times a day PRN Constipation  oxyCODONE    IR 5 milliGRAM(s) Oral once PRN Moderate to Severe Pain (4-10)  oxyCODONE    IR 5 milliGRAM(s) Oral every 3 hours PRN Moderate to Severe Pain (4-10)  simethicone 80 milliGRAM(s) Chew every 4 hours PRN Gas R3 Antepartum Note, HD#2    Patient seen and examined at bedside, no acute overnight events. No acute complaints. Chest pain has resolved. Pt reports denies HA, epigastric pain, blurred vision, CP, SOB, N/V, fevers, and chills. Bay in place.    Vital Signs Last 24 Hours  T(C): 37.1 (08-17-20 @ 00:00), Max: 37.1 (08-17-20 @ 00:00)  HR: 90 (08-17-20 @ 02:00) (79 - 115)  BP: 104/52 (08-17-20 @ 02:00) (86/67 - 126/71)  RR: 15 (08-17-20 @ 02:00) (12 - 24)  SpO2: 97% (08-17-20 @ 02:00) (97% - 100%)      Physical Exam:  General: NAD  Abdomen: Soft, non-tender. Dressing removed, incision C/D/I. Steris in place.  Ext: No pain or swelling      Labs:             11.7   16.37 )-----------( 120      ( 08-16 @ 13:00 )             34.3     08-16 @ 06:45    137  |  106  |  6   ----------------------------<  126  4.0   |  19  |  0.34    Ca    8.6      08-16 @ 06:45  Phos  2.7     08-16 @ 06:45  Mg     1.5     08-16 @ 06:45    TPro  5.4  /  Alb  3.1  /  TBili  0.4  /  DBili  x   /  AST  20  /  ALT  10  /  AlkPhos  119  08-16 @ 06:45    PT/INR - ( 08-16 @ 06:45 )   PT: 12.6 SEC;   INR: 1.11     PTT - ( 08-16 @ 06:45 )  PTT:26.1 SEC        MEDICATIONS  (STANDING):  acetaminophen   Tablet .. 975 milliGRAM(s) Oral every 6 hours  diphtheria/tetanus/pertussis (acellular) Vaccine (ADAcel) 0.5 milliLiter(s) IntraMuscular once  heparin   Injectable 5000 Unit(s) SubCutaneous every 8 hours  lactated ringers. 1000 milliLiter(s) (125 mL/Hr) IV Continuous <Continuous>  methylergonovine 0.2 milliGRAM(s) Oral every 4 hours  oxytocin Infusion 333.333 milliUNIT(s)/Min (1000 mL/Hr) IV Continuous <Continuous>    MEDICATIONS  (PRN):  diphenhydrAMINE 25 milliGRAM(s) Oral every 6 hours PRN Itching  lanolin Ointment 1 Application(s) Topical every 6 hours PRN Sore Nipples  magnesium hydroxide Suspension 30 milliLiter(s) Oral two times a day PRN Constipation  oxyCODONE    IR 5 milliGRAM(s) Oral once PRN Moderate to Severe Pain (4-10)  oxyCODONE    IR 5 milliGRAM(s) Oral every 3 hours PRN Moderate to Severe Pain (4-10)  simethicone 80 milliGRAM(s) Chew every 4 hours PRN Gas

## 2020-08-17 NOTE — PROGRESS NOTE ADULT - SUBJECTIVE AND OBJECTIVE BOX
Anesthesia Post  Note    Post op day 1 s/p      Post  perative 24 hour analgesia  protocol for following type of anesthetic:   [ x  ]  Spinal        [   ]  Epidural      [   ]  General       (See also OB Post op pain note)    INTERVAL HPI/OVERNIGHT EVENTS: In Surgical ICU for pending COVID results. COVID status is unknown    25y Female     Vital Signs Last 24 Hrs  T(C): 37.7 (17 Aug 2020 07:44), Max: 37.7 (17 Aug 2020 07:44)  T(F): 99.8 (17 Aug 2020 07:44), Max: 99.8 (17 Aug 2020 07:44)  HR: 112 (17 Aug 2020 07:44) (81 - 112)  BP: 98/53 (17 Aug 2020 07:00) (86/67 - 117/55)  BP(mean): 63 (17 Aug 2020 07:00) (62 - 92)  RR: 21 (17 Aug 2020 07:44) (13 - 24)  SpO2: 97% (17 Aug 2020 07:44) (97% - 100%)    Patient seen at:   07:40    Doing well, no anesthetic complications or complaints noted or reported.  Pain is controlled.  Sitting in recliner and out of bed. Regular nurse  entering room. Lactate nurses in the room. Patient comfortable and without complaint.

## 2020-08-18 DIAGNOSIS — Z98.890 OTHER SPECIFIED POSTPROCEDURAL STATES: ICD-10-CM

## 2020-08-18 LAB
BASOPHILS # BLD AUTO: 0.02 K/UL — SIGNIFICANT CHANGE UP (ref 0–0.2)
BASOPHILS NFR BLD AUTO: 0.1 % — SIGNIFICANT CHANGE UP (ref 0–2)
EOSINOPHIL # BLD AUTO: 0.02 K/UL — SIGNIFICANT CHANGE UP (ref 0–0.5)
EOSINOPHIL NFR BLD AUTO: 0.1 % — SIGNIFICANT CHANGE UP (ref 0–6)
HCT VFR BLD CALC: 27.1 % — LOW (ref 34.5–45)
HGB BLD-MCNC: 8.9 G/DL — LOW (ref 11.5–15.5)
IMM GRANULOCYTES NFR BLD AUTO: 0.7 % — SIGNIFICANT CHANGE UP (ref 0–1.5)
LYMPHOCYTES # BLD AUTO: 1.39 K/UL — SIGNIFICANT CHANGE UP (ref 1–3.3)
LYMPHOCYTES # BLD AUTO: 10.2 % — LOW (ref 13–44)
MCHC RBC-ENTMCNC: 27.8 PG — SIGNIFICANT CHANGE UP (ref 27–34)
MCHC RBC-ENTMCNC: 32.8 % — SIGNIFICANT CHANGE UP (ref 32–36)
MCV RBC AUTO: 84.7 FL — SIGNIFICANT CHANGE UP (ref 80–100)
MONOCYTES # BLD AUTO: 1.39 K/UL — HIGH (ref 0–0.9)
MONOCYTES NFR BLD AUTO: 10.2 % — SIGNIFICANT CHANGE UP (ref 2–14)
NEUTROPHILS # BLD AUTO: 10.68 K/UL — HIGH (ref 1.8–7.4)
NEUTROPHILS NFR BLD AUTO: 78.7 % — HIGH (ref 43–77)
NRBC # FLD: 0 K/UL — SIGNIFICANT CHANGE UP (ref 0–0)
PLATELET # BLD AUTO: 116 K/UL — LOW (ref 150–400)
PMV BLD: 14.5 FL — HIGH (ref 7–13)
RBC # BLD: 3.2 M/UL — LOW (ref 3.8–5.2)
RBC # FLD: 16.2 % — HIGH (ref 10.3–14.5)
WBC # BLD: 13.6 K/UL — HIGH (ref 3.8–10.5)
WBC # FLD AUTO: 13.6 K/UL — HIGH (ref 3.8–10.5)

## 2020-08-18 RX ORDER — SODIUM CHLORIDE 9 MG/ML
3 INJECTION INTRAMUSCULAR; INTRAVENOUS; SUBCUTANEOUS EVERY 8 HOURS
Refills: 0 | Status: DISCONTINUED | OUTPATIENT
Start: 2020-08-18 | End: 2020-08-22

## 2020-08-18 RX ORDER — MAGNESIUM HYDROXIDE 400 MG/1
30 TABLET, CHEWABLE ORAL DAILY
Refills: 0 | Status: DISCONTINUED | OUTPATIENT
Start: 2020-08-18 | End: 2020-08-22

## 2020-08-18 RX ADMIN — SIMETHICONE 80 MILLIGRAM(S): 80 TABLET, CHEWABLE ORAL at 09:06

## 2020-08-18 RX ADMIN — HEPARIN SODIUM 5000 UNIT(S): 5000 INJECTION INTRAVENOUS; SUBCUTANEOUS at 22:16

## 2020-08-18 RX ADMIN — Medication 600 MILLIGRAM(S): at 16:00

## 2020-08-18 RX ADMIN — SIMETHICONE 80 MILLIGRAM(S): 80 TABLET, CHEWABLE ORAL at 22:16

## 2020-08-18 RX ADMIN — Medication 600 MILLIGRAM(S): at 17:00

## 2020-08-18 RX ADMIN — Medication 600 MILLIGRAM(S): at 09:06

## 2020-08-18 RX ADMIN — Medication 600 MILLIGRAM(S): at 05:00

## 2020-08-18 RX ADMIN — HEPARIN SODIUM 5000 UNIT(S): 5000 INJECTION INTRAVENOUS; SUBCUTANEOUS at 09:06

## 2020-08-18 RX ADMIN — SIMETHICONE 80 MILLIGRAM(S): 80 TABLET, CHEWABLE ORAL at 16:00

## 2020-08-18 RX ADMIN — Medication 600 MILLIGRAM(S): at 22:50

## 2020-08-18 RX ADMIN — SIMETHICONE 80 MILLIGRAM(S): 80 TABLET, CHEWABLE ORAL at 04:05

## 2020-08-18 RX ADMIN — MAGNESIUM HYDROXIDE 30 MILLILITER(S): 400 TABLET, CHEWABLE ORAL at 04:03

## 2020-08-18 RX ADMIN — Medication 600 MILLIGRAM(S): at 22:16

## 2020-08-18 RX ADMIN — Medication 600 MILLIGRAM(S): at 04:05

## 2020-08-18 RX ADMIN — SODIUM CHLORIDE 100 MILLILITER(S): 9 INJECTION, SOLUTION INTRAVENOUS at 07:01

## 2020-08-18 RX ADMIN — Medication 600 MILLIGRAM(S): at 10:00

## 2020-08-18 NOTE — PROGRESS NOTE ADULT - ASSESSMENT
A/P: 26y/o , h/o classical  section at 26wks, now POD2 s/p an emergent LTCS @ 35+2 c/b and B-oneal sutures, and hemorrhagic shock requiring pressor support intra-op. EBL 3500ml. Massive transfusion protocol initiated, and patient received 3upRBCs/1u FFP.

## 2020-08-18 NOTE — PROGRESS NOTE ADULT - SUBJECTIVE AND OBJECTIVE BOX
[facility] Progress Note: HD#3 POD#2    Patient seen and examined at bedside, no acute overnight events. No acute complaints. Patient is tolerating a clear liquid diet. Endorses she thinks she can tolerate solids. Denies feeling bloated. She is passing flatus and ambulating regularly. Pain is well controlled. Denies CP, SOB, N/V, fevers, and chills. Bay in place.    O:  Vital Signs Last 24 Hrs  T(C): 36.4 (18 Aug 2020 14:21), Max: 37.4 (17 Aug 2020 18:00)  T(F): 97.5 (18 Aug 2020 14:21), Max: 99.3 (17 Aug 2020 18:00)  HR: 91 (18 Aug 2020 14:21) (78 - 105)  BP: 122/71 (18 Aug 2020 14:21) (96/57 - 122/71)  BP(mean): --  RR: 18 (18 Aug 2020 14:21) (17 - 18)  SpO2: 99% (18 Aug 2020 14:21) (98% - 100%)    MEDICATIONS  (STANDING):  acetaminophen   Tablet .. 975 milliGRAM(s) Oral every 6 hours  diphtheria/tetanus/pertussis (acellular) Vaccine (ADAcel) 0.5 milliLiter(s) IntraMuscular once  heparin   Injectable 5000 Unit(s) SubCutaneous every 12 hours  ibuprofen  Tablet. 600 milliGRAM(s) Oral every 6 hours  pantoprazole  Injectable 40 milliGRAM(s) IV Push daily  sodium chloride 0.9% lock flush 3 milliLiter(s) IV Push every 8 hours      MEDICATIONS  (PRN):  diphenhydrAMINE 25 milliGRAM(s) Oral every 6 hours PRN Itching  lanolin Ointment 1 Application(s) Topical every 6 hours PRN Sore Nipples  magnesium hydroxide Suspension 30 milliLiter(s) Oral daily PRN Constipation  oxyCODONE    IR 5 milliGRAM(s) Oral every 4 hours PRN Severe Pain (7 - 10)  simethicone 80 milliGRAM(s) Chew every 4 hours PRN Gas      Labs:  Blood type: O Positive  Rubella IgG: RPR: Negative                          8.9<L>   13.60<H> >-----------< 116<L>    ( 08-18 @ 05:30 )             27.1<L>                        10.7<L>   13.03<H> >-----------< 112<L>    ( 08-17 @ 04:25 )             32.6<L>                        11.7   16.37<H> >-----------< 120<L>    ( 08-16 @ 13:00 )             34.3<L>                        12.3   19.98<H> >-----------< 110<L>    ( 08-16 @ 06:45 )             37.1                        12.2   17.78<H> >-----------< 173    ( 08-16 @ 04:13 )             38.6    08-17-20 @ 04:25      139  |  103  |  4<L>  ----------------------------<  104<H>  3.7   |  24  |  0.40<L>    08-16-20 @ 06:45      137  |  106  |  6<L>  ----------------------------<  126<H>  4.0   |  19<L>  |  0.34<L>    08-16-20 @ 04:13      136  |  101  |  7   ----------------------------<  133<H>  4.1   |  20<L>  |  0.47<L>        Ca    9.0      17 Aug 2020 04:25  Ca    8.6      16 Aug 2020 06:45  Ca    9.5      16 Aug 2020 04:13  Phos  3.5     08-17  Phos  2.7     08-16  Mg     1.6     08-17  Mg     1.5<L>     08-16    TPro  5.4<L>  /  Alb  2.8<L>  /  TBili  0.5  /  DBili  x   /  AST  24  /  ALT  12  /  AlkPhos  105  08-17-20 @ 04:25  TPro  5.4<L>  /  Alb  3.1<L>  /  TBili  0.4  /  DBili  x   /  AST  20  /  ALT  10  /  AlkPhos  119  08-16-20 @ 06:45  TPro  6.7  /  Alb  3.5  /  TBili  < 0.2<L>  /  DBili  x   /  AST  28  /  ALT  14  /  AlkPhos  155<H>  08-16-20 @ 04:13

## 2020-08-18 NOTE — PROGRESS NOTE ADULT - SUBJECTIVE AND OBJECTIVE BOX
MFM Fellow    Patient seen at bedside. Transferred to floor from SICU. OOB as tolerated. Tolerating full liquids. Denies any N/V, fevers/chills, CP/SOB. Pain improved. Recently has taken some motrin. Small amount of flatus yesterday, not alot     12 point ROS negative except as outlined above    Vital Signs Last 24 Hrs  T(C): 36.9 (18 Aug 2020 05:15), Max: 37.4 (17 Aug 2020 18:00)  T(F): 98.4 (18 Aug 2020 05:15), Max: 99.3 (17 Aug 2020 18:00)  HR: 85 (18 Aug 2020 05:15) (78 - 106)  BP: 105/47 (18 Aug 2020 05:15) (96/57 - 114/52)  BP(mean): 68 (17 Aug 2020 12:00) (68 - 73)  RR: 17 (18 Aug 2020 05:15) (17 - 22)  SpO2: 98% (18 Aug 2020 05:15) (97% - 99%)    PHYSICAL EXAM:    GA: NAD, A+0 x 3  Abd: softly distended, tympanic, no rebound;guardin  Incision: clean, dry and intact; steri-strips in place  Extremities: no swelling or calf tenderness          LABS:                        8.9    13.60 )-----------( 116      ( 18 Aug 2020 05:30 )             27.1     08-17    139  |  103  |  4<L>  ----------------------------<  104<H>  3.7   |  24  |  0.40<L>    Ca    9.0      17 Aug 2020 04:25  Phos  3.5     08-17  Mg     1.6     08-17    TPro  5.4<L>  /  Alb  2.8<L>  /  TBili  0.5  /  DBili  x   /  AST  24  /  ALT  12  /  AlkPhos  105  08-17

## 2020-08-18 NOTE — PROGRESS NOTE ADULT - ASSESSMENT
A/P: 24y/o , h/o classical  section at 26wks, now POD2 s/p an emergent LTCS @ 35+2 c/b and B-oneal sutures, and hemorrhagic shock requiring pressor support intra-op. EBL 3500ml. Massive transfusion protocol initiated, and patient received 3upRBCs/1u FFP. Patient was brought to the SICU for post-op care. POD#0 the patient complained of pleuritic chest pain; EKG NSR, troponins downtrended, CXR wnl. Pain now resolved. On POD#1 an NGT was placed for decompression due to patient's complaint of bloating and a large gastric bubble seen on CXR. NGT was d/c after 1 hour due to patient intolerance. Patient has been tolerating a liquid diet. Has not had n/v. Patient was transferred to the postpartum floor POD#1. Patient recovering well post-operatively and is hemodynamically stable.    Neuro: Pain well controlled, continue Tylenol and ibuprofen standing, oxy prn for severe pain.  CV: s/p phenylephrine drip, hemodynamically stable. H/H downtrending, but expected given blood loss and requirement for transfusion. Will continue to trend.  Pulm: Saturating well on room air, continue incentive spirometer, oob/amb. Pleuritic chest pain now resolved.  GI: Advance to regular diet today.  : Voiding spontaneously.  Heme: SCDs, HSQ, and ambulation for DVT ppx.  FEN: LR@125. Replete electrolytes prn.  ID: Afebrile.     Ly Garza PGY-3

## 2020-08-18 NOTE — PROGRESS NOTE ADULT - SUBJECTIVE AND OBJECTIVE BOX
Postpartum Note,  Section  She is a  25y woman who is now post-operative day:  2    Subjective:  The patient feels well.  She is ambulating.   She is tolerating regular diet.  She denies nausea and vomiting.  She is voiding.  Her pain is controlled.  She reports normal postpartum bleeding.    Vital Signs Last 24 Hrs  T(C): 36.7 (18 Aug 2020 11:00), Max: 37.4 (17 Aug 2020 18:00)  T(F): 98 (18 Aug 2020 11:00), Max: 99.3 (17 Aug 2020 18:00)  HR: 98 (18 Aug 2020 11:00) (78 - 105)  BP: 110/68 (18 Aug 2020 11:00) (96/57 - 112/50)  BP(mean): --  RR: 18 (18 Aug 2020 11:00) (17 - 18)  SpO2: 100% (18 Aug 2020 11:00) (98% - 100%)    Physical exam:  Gen: NAD  Breast: Soft, nontender, not engorged.  Abdomen: Soft, nontender, no distension , firm uterine fundus at umbilicus.  Incision: Clean, dry, and intact   Pelvic: Normal lochia noted  Ext: No calf tenderness    LABS:                        8.9    13.60 )-----------( 116      ( 18 Aug 2020 05:30 )             27.1         Allergies    No Known Allergies    Intolerances      MEDICATIONS  (STANDING):  acetaminophen   Tablet .. 975 milliGRAM(s) Oral every 6 hours  diphtheria/tetanus/pertussis (acellular) Vaccine (ADAcel) 0.5 milliLiter(s) IntraMuscular once  heparin   Injectable 5000 Unit(s) SubCutaneous every 12 hours  ibuprofen  Tablet. 600 milliGRAM(s) Oral every 6 hours  lactated ringers. 1000 milliLiter(s) (100 mL/Hr) IV Continuous <Continuous>  pantoprazole  Injectable 40 milliGRAM(s) IV Push daily    MEDICATIONS  (PRN):  diphenhydrAMINE 25 milliGRAM(s) Oral every 6 hours PRN Itching  lanolin Ointment 1 Application(s) Topical every 6 hours PRN Sore Nipples  magnesium hydroxide Suspension 30 milliLiter(s) Oral daily PRN Constipation  oxyCODONE    IR 5 milliGRAM(s) Oral every 4 hours PRN Severe Pain (7 - 10)  simethicone 80 milliGRAM(s) Chew every 4 hours PRN Gas        Assessment and Plan  POD # 2 s/p  section  Doing well.  Encourage ambulation.

## 2020-08-19 ENCOUNTER — TRANSCRIPTION ENCOUNTER (OUTPATIENT)
Age: 25
End: 2020-08-19

## 2020-08-19 LAB
BASOPHILS # BLD AUTO: 0.03 K/UL — SIGNIFICANT CHANGE UP (ref 0–0.2)
BASOPHILS NFR BLD AUTO: 0.4 % — SIGNIFICANT CHANGE UP (ref 0–2)
BLD GP AB SCN SERPL QL: NEGATIVE — SIGNIFICANT CHANGE UP
EOSINOPHIL # BLD AUTO: 0.1 K/UL — SIGNIFICANT CHANGE UP (ref 0–0.5)
EOSINOPHIL NFR BLD AUTO: 1.3 % — SIGNIFICANT CHANGE UP (ref 0–6)
HCT VFR BLD CALC: 28.2 % — LOW (ref 34.5–45)
HGB BLD-MCNC: 9.1 G/DL — LOW (ref 11.5–15.5)
IMM GRANULOCYTES NFR BLD AUTO: 1.1 % — SIGNIFICANT CHANGE UP (ref 0–1.5)
LYMPHOCYTES # BLD AUTO: 1.85 K/UL — SIGNIFICANT CHANGE UP (ref 1–3.3)
LYMPHOCYTES # BLD AUTO: 23.2 % — SIGNIFICANT CHANGE UP (ref 13–44)
MCHC RBC-ENTMCNC: 27.4 PG — SIGNIFICANT CHANGE UP (ref 27–34)
MCHC RBC-ENTMCNC: 32.3 % — SIGNIFICANT CHANGE UP (ref 32–36)
MCV RBC AUTO: 84.9 FL — SIGNIFICANT CHANGE UP (ref 80–100)
MONOCYTES # BLD AUTO: 0.56 K/UL — SIGNIFICANT CHANGE UP (ref 0–0.9)
MONOCYTES NFR BLD AUTO: 7 % — SIGNIFICANT CHANGE UP (ref 2–14)
NEUTROPHILS # BLD AUTO: 5.34 K/UL — SIGNIFICANT CHANGE UP (ref 1.8–7.4)
NEUTROPHILS NFR BLD AUTO: 67 % — SIGNIFICANT CHANGE UP (ref 43–77)
NRBC # FLD: 0 K/UL — SIGNIFICANT CHANGE UP (ref 0–0)
PLATELET # BLD AUTO: 137 K/UL — LOW (ref 150–400)
PMV BLD: 12.8 FL — SIGNIFICANT CHANGE UP (ref 7–13)
RBC # BLD: 3.32 M/UL — LOW (ref 3.8–5.2)
RBC # FLD: 16.2 % — HIGH (ref 10.3–14.5)
RH IG SCN BLD-IMP: POSITIVE — SIGNIFICANT CHANGE UP
WBC # BLD: 7.97 K/UL — SIGNIFICANT CHANGE UP (ref 3.8–10.5)
WBC # FLD AUTO: 7.97 K/UL — SIGNIFICANT CHANGE UP (ref 3.8–10.5)

## 2020-08-19 RX ORDER — ACETAMINOPHEN 500 MG
3 TABLET ORAL
Qty: 0 | Refills: 0 | DISCHARGE
Start: 2020-08-19

## 2020-08-19 RX ORDER — SENNA PLUS 8.6 MG/1
2 TABLET ORAL DAILY
Refills: 0 | Status: DISCONTINUED | OUTPATIENT
Start: 2020-08-19 | End: 2020-08-22

## 2020-08-19 RX ORDER — IBUPROFEN 200 MG
1 TABLET ORAL
Qty: 0 | Refills: 0 | DISCHARGE
Start: 2020-08-19

## 2020-08-19 RX ORDER — ACETAMINOPHEN 500 MG
2 TABLET ORAL
Qty: 0 | Refills: 0 | DISCHARGE

## 2020-08-19 RX ORDER — IBUPROFEN 200 MG
3 TABLET ORAL
Qty: 0 | Refills: 0 | DISCHARGE

## 2020-08-19 RX ADMIN — Medication 600 MILLIGRAM(S): at 06:02

## 2020-08-19 RX ADMIN — Medication 600 MILLIGRAM(S): at 18:00

## 2020-08-19 RX ADMIN — Medication 600 MILLIGRAM(S): at 12:30

## 2020-08-19 RX ADMIN — HEPARIN SODIUM 5000 UNIT(S): 5000 INJECTION INTRAVENOUS; SUBCUTANEOUS at 22:36

## 2020-08-19 RX ADMIN — Medication 600 MILLIGRAM(S): at 17:04

## 2020-08-19 RX ADMIN — Medication 600 MILLIGRAM(S): at 06:16

## 2020-08-19 RX ADMIN — HEPARIN SODIUM 5000 UNIT(S): 5000 INJECTION INTRAVENOUS; SUBCUTANEOUS at 11:26

## 2020-08-19 RX ADMIN — MAGNESIUM HYDROXIDE 30 MILLILITER(S): 400 TABLET, CHEWABLE ORAL at 06:14

## 2020-08-19 RX ADMIN — Medication 600 MILLIGRAM(S): at 23:07

## 2020-08-19 RX ADMIN — Medication 600 MILLIGRAM(S): at 11:26

## 2020-08-19 NOTE — DISCHARGE NOTE OB - NS OB DC TDAP PATIENT INFO
Vaccine Information Sheet (VIS) provided - VIS Date: 5/9/13 Risks/benefits discussed with patient or patient surrogate

## 2020-08-19 NOTE — DISCHARGE NOTE OB - CARE PLAN
Principal Discharge DX:	 delivery delivered  Goal:	home with independent care  Assessment and plan of treatment:	return 1 week  Secondary Diagnosis:	Uterine rupture  Secondary Diagnosis:	Anemia due to acute blood loss

## 2020-08-19 NOTE — PROGRESS NOTE ADULT - ASSESSMENT
A/P: 24y/o , h/o classical  section at 26wks, now POD#3 s/p an emergent LTCS @ 35+2 c/b and B-oneal sutures, and hemorrhagic shock requiring pressor support intra-op. EBL 3500ml. Massive transfusion protocol initiated, and patient received 3upRBCs/1u FFP. Patient was brought to the SICU for post-op care. POD#0 the patient complained of pleuritic chest pain; EKG NSR, troponins downtrended, CXR wnl. Pain now resolved. On POD#1 an NGT was placed for decompression due to patient's complaint of bloating and a large gastric bubble seen on CXR. NGT was d/c after 1 hour due to patient intolerance. Patient was transferred to the postpartum floor POD#1. Patient has been tolerating solids since yesterday.  Patient recovering well post-operatively and is hemodynamically stable.    Neuro: Pain well controlled, continue Tylenol and ibuprofen standing, oxy prn for severe pain.  CV: s/p phenylephrine drip, hemodynamically stable. CBC reviewed this AM and H/H is stable.  Pulm: Saturating well on room air, continue incentive spirometer, oob/amb. Pleuritic chest pain now resolved.  GI: Continue regular diet.  : Voiding spontaneously.  Heme: SCDs, HSQ, and ambulation for DVT ppx.  FEN: SLIV.   ID: Afebrile.   Dispo: For potential d/c home today.    Ly Garza PGY-3

## 2020-08-19 NOTE — DISCHARGE NOTE OB - MATERIALS PROVIDED
Birth Certificate Instructions/Guide to Postpartum Care/Shaken Baby Prevention Handout Vaccinations/Back To Sleep Handout/Shaken Baby Prevention Handout/[facility] [place] Screening Program/[place]  Immunization Record/Breastfeeding Log/Bottle Feeding Log/Birth Certificate Instructions/Breastfeeding Mother’s Support Group Information/Guide to Postpartum Care/Tdap Vaccination (VIS Pub Date: 2012)

## 2020-08-19 NOTE — DISCHARGE NOTE OB - MEDICATION SUMMARY - MEDICATIONS TO TAKE
I will START or STAY ON the medications listed below when I get home from the hospital:    Prenatal Vitamins  -- 1 tab(s) by mouth once a day  -- Indication: For Pnv    acetaminophen 325 mg oral tablet  -- 3 tab(s) by mouth every 6 hours  -- Indication: For Pain    ibuprofen 600 mg oral tablet  -- 1 tab(s) by mouth every 6 hours  -- Indication: For Pain I will START or STAY ON the medications listed below when I get home from the hospital:    Prenatal Vitamins  -- 1 tab(s) by mouth once a day  -- Indication: For Pnv    ibuprofen 600 mg oral tablet  -- 1 tab(s) by mouth every 6 hours, As Needed  -- Indication: For Pain    acetaminophen 325 mg oral tablet  -- 3 tab(s) by mouth every 6 hours, As Needed  -- Indication: For Pain

## 2020-08-19 NOTE — PROGRESS NOTE ADULT - SUBJECTIVE AND OBJECTIVE BOX
Post Op C/S day 3      Pt without complaints    Vital Signs Last 24 Hrs  T(C): 36.7 (19 Aug 2020 06:07), Max: 36.8 (18 Aug 2020 22:00)  T(F): 98 (19 Aug 2020 06:07), Max: 98.2 (18 Aug 2020 22:00)  HR: 78 (19 Aug 2020 06:07) (78 - 98)  BP: 105/56 (19 Aug 2020 06:07) (105/56 - 122/78)  BP(mean): --  RR: 17 (19 Aug 2020 06:07) (17 - 18)  SpO2: 98% (19 Aug 2020 06:07) (98% - 100%)  MEDICATIONS  (STANDING):  acetaminophen   Tablet .. 975 milliGRAM(s) Oral every 6 hours  diphtheria/tetanus/pertussis (acellular) Vaccine (ADAcel) 0.5 milliLiter(s) IntraMuscular once  heparin   Injectable 5000 Unit(s) SubCutaneous every 12 hours  ibuprofen  Tablet. 600 milliGRAM(s) Oral every 6 hours  pantoprazole  Injectable 40 milliGRAM(s) IV Push daily  sodium chloride 0.9% lock flush 3 milliLiter(s) IV Push every 8 hours    MEDICATIONS  (PRN):  diphenhydrAMINE 25 milliGRAM(s) Oral every 6 hours PRN Itching  lanolin Ointment 1 Application(s) Topical every 6 hours PRN Sore Nipples  magnesium hydroxide Suspension 30 milliLiter(s) Oral daily PRN Constipation  oxyCODONE    IR 5 milliGRAM(s) Oral every 4 hours PRN Severe Pain (7 - 10)  simethicone 80 milliGRAM(s) Chew every 4 hours PRN Gas        Abdomen soft  fundus firm  Incision clean dry and intact  extremities non tender  lochia wnl                          9.1    7.97  )-----------( 137      ( 19 Aug 2020 05:58 )             28.2     Patient doing well    Routine post operative care

## 2020-08-19 NOTE — DISCHARGE NOTE OB - PATIENT PORTAL LINK FT
You can access the FollowMyHealth Patient Portal offered by [facility] by registering at the following website: [email or web address]. By joining [employer]’s FollowMyHealth portal, you will also be able to view your health information using other applications (apps) compatible with our system.

## 2020-08-19 NOTE — PROGRESS NOTE ADULT - SUBJECTIVE AND OBJECTIVE BOX
R3 OBNORMAN Progress Note: HD#4 POD#3    Interval events:  Patient transition to a regular diet last night. She is tolerating solids but has not consumed an entire meal.    Patient seen and examined at bedside, no acute overnight events. No acute complaints. She is passing flatus and ambulating regularly. Voiding spontaneously. Pain is well controlled. Denies CP, SOB, N/V, fevers, and chills.    Vital Signs Last 24 Hours  T(C): 36.4 (08-19-20 @ 09:25), Max: 36.8 (08-18-20 @ 22:00)  HR: 81 (08-19-20 @ 09:25) (78 - 98)  BP: 107/67 (08-19-20 @ 09:25) (105/56 - 122/78)  RR: 16 (08-19-20 @ 09:25) (16 - 18)  SpO2: 98% (08-19-20 @ 09:25) (98% - 100%)      Physical Exam:  General: NAD  Abdomen: Soft, non-tender, ND  Incision: CDI  Ext: No pain or swelling      Labs:             9.1    7.97  )-----------( 137      ( 08-19 @ 05:58 )             28.2       MEDICATIONS  (STANDING):  acetaminophen   Tablet .. 975 milliGRAM(s) Oral every 6 hours  diphtheria/tetanus/pertussis (acellular) Vaccine (ADAcel) 0.5 milliLiter(s) IntraMuscular once  heparin   Injectable 5000 Unit(s) SubCutaneous every 12 hours  ibuprofen  Tablet. 600 milliGRAM(s) Oral every 6 hours  pantoprazole  Injectable 40 milliGRAM(s) IV Push daily  senna 2 Tablet(s) Oral daily  sodium chloride 0.9% lock flush 3 milliLiter(s) IV Push every 8 hours    MEDICATIONS  (PRN):  diphenhydrAMINE 25 milliGRAM(s) Oral every 6 hours PRN Itching  lanolin Ointment 1 Application(s) Topical every 6 hours PRN Sore Nipples  magnesium hydroxide Suspension 30 milliLiter(s) Oral daily PRN Constipation  oxyCODONE    IR 5 milliGRAM(s) Oral every 4 hours PRN Severe Pain (7 - 10)  simethicone 80 milliGRAM(s) Chew every 4 hours PRN Gas

## 2020-08-19 NOTE — DISCHARGE NOTE OB - CARE PROVIDER_API CALL
Yadira Peterson)  Obstetrics and Gynecology  [address] [place] [address]  Phone: [phone]  Fax: [phone]  Follow Up Time:

## 2020-08-19 NOTE — DISCHARGE NOTE OB - DISCHARGE DISPOSITION
Home/with Baby with Baby/Home/without Baby/baby in the nicu baby in the nicu  going home also/with Baby/Home

## 2020-08-20 LAB
BASOPHILS # BLD AUTO: 0.02 K/UL — SIGNIFICANT CHANGE UP (ref 0–0.2)
BASOPHILS NFR BLD AUTO: 0.2 % — SIGNIFICANT CHANGE UP (ref 0–2)
EOSINOPHIL # BLD AUTO: 0.15 K/UL — SIGNIFICANT CHANGE UP (ref 0–0.5)
EOSINOPHIL NFR BLD AUTO: 1.9 % — SIGNIFICANT CHANGE UP (ref 0–6)
HCT VFR BLD CALC: 28.9 % — LOW (ref 34.5–45)
HGB BLD-MCNC: 9.1 G/DL — LOW (ref 11.5–15.5)
IMM GRANULOCYTES NFR BLD AUTO: 1.1 % — SIGNIFICANT CHANGE UP (ref 0–1.5)
LYMPHOCYTES # BLD AUTO: 1.28 K/UL — SIGNIFICANT CHANGE UP (ref 1–3.3)
LYMPHOCYTES # BLD AUTO: 16 % — SIGNIFICANT CHANGE UP (ref 13–44)
MCHC RBC-ENTMCNC: 27.4 PG — SIGNIFICANT CHANGE UP (ref 27–34)
MCHC RBC-ENTMCNC: 31.5 % — LOW (ref 32–36)
MCV RBC AUTO: 87 FL — SIGNIFICANT CHANGE UP (ref 80–100)
MONOCYTES # BLD AUTO: 0.59 K/UL — SIGNIFICANT CHANGE UP (ref 0–0.9)
MONOCYTES NFR BLD AUTO: 7.4 % — SIGNIFICANT CHANGE UP (ref 2–14)
NEUTROPHILS # BLD AUTO: 5.88 K/UL — SIGNIFICANT CHANGE UP (ref 1.8–7.4)
NEUTROPHILS NFR BLD AUTO: 73.4 % — SIGNIFICANT CHANGE UP (ref 43–77)
NRBC # FLD: 0 K/UL — SIGNIFICANT CHANGE UP (ref 0–0)
PLATELET # BLD AUTO: 199 K/UL — SIGNIFICANT CHANGE UP (ref 150–400)
PMV BLD: 13.5 FL — HIGH (ref 7–13)
RBC # BLD: 3.32 M/UL — LOW (ref 3.8–5.2)
RBC # FLD: 15.9 % — HIGH (ref 10.3–14.5)
WBC # BLD: 8.01 K/UL — SIGNIFICANT CHANGE UP (ref 3.8–10.5)
WBC # FLD AUTO: 8.01 K/UL — SIGNIFICANT CHANGE UP (ref 3.8–10.5)

## 2020-08-20 RX ORDER — FERROUS SULFATE 325(65) MG
325 TABLET ORAL THREE TIMES A DAY
Refills: 0 | Status: DISCONTINUED | OUTPATIENT
Start: 2020-08-20 | End: 2020-08-22

## 2020-08-20 RX ORDER — ERTAPENEM SODIUM 1 G/1
1000 INJECTION, POWDER, LYOPHILIZED, FOR SOLUTION INTRAMUSCULAR; INTRAVENOUS ONCE
Refills: 0 | Status: COMPLETED | OUTPATIENT
Start: 2020-08-20 | End: 2020-08-20

## 2020-08-20 RX ORDER — ERTAPENEM SODIUM 1 G/1
1000 INJECTION, POWDER, LYOPHILIZED, FOR SOLUTION INTRAMUSCULAR; INTRAVENOUS EVERY 24 HOURS
Refills: 0 | Status: DISCONTINUED | OUTPATIENT
Start: 2020-08-21 | End: 2020-08-22

## 2020-08-20 RX ORDER — ASCORBIC ACID 60 MG
500 TABLET,CHEWABLE ORAL DAILY
Refills: 0 | Status: DISCONTINUED | OUTPATIENT
Start: 2020-08-20 | End: 2020-08-22

## 2020-08-20 RX ORDER — ERTAPENEM SODIUM 1 G/1
INJECTION, POWDER, LYOPHILIZED, FOR SOLUTION INTRAMUSCULAR; INTRAVENOUS
Refills: 0 | Status: DISCONTINUED | OUTPATIENT
Start: 2020-08-20 | End: 2020-08-22

## 2020-08-20 RX ORDER — SODIUM CHLORIDE 9 MG/ML
1000 INJECTION, SOLUTION INTRAVENOUS ONCE
Refills: 0 | Status: DISCONTINUED | OUTPATIENT
Start: 2020-08-20 | End: 2020-08-22

## 2020-08-20 RX ADMIN — HEPARIN SODIUM 5000 UNIT(S): 5000 INJECTION INTRAVENOUS; SUBCUTANEOUS at 20:34

## 2020-08-20 RX ADMIN — Medication 325 MILLIGRAM(S): at 21:43

## 2020-08-20 RX ADMIN — Medication 975 MILLIGRAM(S): at 12:59

## 2020-08-20 RX ADMIN — ERTAPENEM SODIUM 120 MILLIGRAM(S): 1 INJECTION, POWDER, LYOPHILIZED, FOR SOLUTION INTRAMUSCULAR; INTRAVENOUS at 14:29

## 2020-08-20 RX ADMIN — SENNA PLUS 2 TABLET(S): 8.6 TABLET ORAL at 20:29

## 2020-08-20 RX ADMIN — Medication 600 MILLIGRAM(S): at 00:00

## 2020-08-20 RX ADMIN — SODIUM CHLORIDE 3 MILLILITER(S): 9 INJECTION INTRAMUSCULAR; INTRAVENOUS; SUBCUTANEOUS at 21:12

## 2020-08-20 RX ADMIN — SIMETHICONE 80 MILLIGRAM(S): 80 TABLET, CHEWABLE ORAL at 11:11

## 2020-08-20 RX ADMIN — SIMETHICONE 80 MILLIGRAM(S): 80 TABLET, CHEWABLE ORAL at 20:29

## 2020-08-20 RX ADMIN — Medication 600 MILLIGRAM(S): at 14:00

## 2020-08-20 RX ADMIN — Medication 500 MILLIGRAM(S): at 12:59

## 2020-08-20 RX ADMIN — Medication 325 MILLIGRAM(S): at 12:59

## 2020-08-20 RX ADMIN — Medication 600 MILLIGRAM(S): at 13:04

## 2020-08-20 RX ADMIN — Medication 975 MILLIGRAM(S): at 13:58

## 2020-08-20 NOTE — PROVIDER CONTACT NOTE (OTHER) - ACTION/TREATMENT ORDERED:
Pt was seen and examined by KARYNA Naidu.Tylenol 975 and motrin 600 mg po was given. Pt was seen and examined by KARYNA Naidu.Tylenol 975 and motrin 600 mg po was given.Cbc ,blood culture x2 and urine culture to be done.Pt to get LR bolus 1000 cc over 6o minutes as per KARYNA Naidu.

## 2020-08-20 NOTE — PROGRESS NOTE ADULT - ASSESSMENT
A/P: 26yo h/o classical c/s POD#3 s/p LTCS c/b uterine rupture requiring admission to SICU, now downgraded to the floors.  Patient is stable and is doing well post-operatively.

## 2020-08-20 NOTE — PROVIDER CONTACT NOTE (OTHER) - ASSESSMENT
1230 vitals bp-112/69,heart rate-90,02 sat 100%.Oral temp was 99.6 .Rectal temp  done at 115 pm as per N.P. Naidu and was 101. 1230 vitals bp-112/69,heart rate-90,02 sat 100%.Oral temp was 99.6 .Rectal temp  done at 115 pm as per N.P. Naidu and was 101.Pt complained of uterine tenderness,and pain on right side of incision.

## 2020-08-21 LAB
BASOPHILS # BLD AUTO: 0.03 K/UL — SIGNIFICANT CHANGE UP (ref 0–0.2)
BASOPHILS NFR BLD AUTO: 0.3 % — SIGNIFICANT CHANGE UP (ref 0–2)
CULTURE RESULTS: SIGNIFICANT CHANGE UP
EOSINOPHIL # BLD AUTO: 0.09 K/UL — SIGNIFICANT CHANGE UP (ref 0–0.5)
EOSINOPHIL NFR BLD AUTO: 1 % — SIGNIFICANT CHANGE UP (ref 0–6)
HCT VFR BLD CALC: 28.9 % — LOW (ref 34.5–45)
HGB BLD-MCNC: 9.3 G/DL — LOW (ref 11.5–15.5)
IMM GRANULOCYTES NFR BLD AUTO: 1.1 % — SIGNIFICANT CHANGE UP (ref 0–1.5)
LYMPHOCYTES # BLD AUTO: 1.51 K/UL — SIGNIFICANT CHANGE UP (ref 1–3.3)
LYMPHOCYTES # BLD AUTO: 17.2 % — SIGNIFICANT CHANGE UP (ref 13–44)
MCHC RBC-ENTMCNC: 26.7 PG — LOW (ref 27–34)
MCHC RBC-ENTMCNC: 32.2 % — SIGNIFICANT CHANGE UP (ref 32–36)
MCV RBC AUTO: 83 FL — SIGNIFICANT CHANGE UP (ref 80–100)
MONOCYTES # BLD AUTO: 0.68 K/UL — SIGNIFICANT CHANGE UP (ref 0–0.9)
MONOCYTES NFR BLD AUTO: 7.7 % — SIGNIFICANT CHANGE UP (ref 2–14)
NEUTROPHILS # BLD AUTO: 6.39 K/UL — SIGNIFICANT CHANGE UP (ref 1.8–7.4)
NEUTROPHILS NFR BLD AUTO: 72.7 % — SIGNIFICANT CHANGE UP (ref 43–77)
NRBC # FLD: 0 K/UL — SIGNIFICANT CHANGE UP (ref 0–0)
PLATELET # BLD AUTO: 228 K/UL — SIGNIFICANT CHANGE UP (ref 150–400)
PMV BLD: 12.1 FL — SIGNIFICANT CHANGE UP (ref 7–13)
RBC # BLD: 3.48 M/UL — LOW (ref 3.8–5.2)
RBC # FLD: 15.6 % — HIGH (ref 10.3–14.5)
SPECIMEN SOURCE: SIGNIFICANT CHANGE UP
WBC # BLD: 8.8 K/UL — SIGNIFICANT CHANGE UP (ref 3.8–10.5)
WBC # FLD AUTO: 8.8 K/UL — SIGNIFICANT CHANGE UP (ref 3.8–10.5)

## 2020-08-21 RX ORDER — ACETAMINOPHEN 500 MG
975 TABLET ORAL ONCE
Refills: 0 | Status: DISCONTINUED | OUTPATIENT
Start: 2020-08-21 | End: 2020-08-22

## 2020-08-21 RX ADMIN — SENNA PLUS 2 TABLET(S): 8.6 TABLET ORAL at 13:52

## 2020-08-21 RX ADMIN — Medication 325 MILLIGRAM(S): at 13:52

## 2020-08-21 RX ADMIN — Medication 325 MILLIGRAM(S): at 06:27

## 2020-08-21 RX ADMIN — HEPARIN SODIUM 5000 UNIT(S): 5000 INJECTION INTRAVENOUS; SUBCUTANEOUS at 10:00

## 2020-08-21 RX ADMIN — SIMETHICONE 80 MILLIGRAM(S): 80 TABLET, CHEWABLE ORAL at 01:51

## 2020-08-21 RX ADMIN — SODIUM CHLORIDE 3 MILLILITER(S): 9 INJECTION INTRAMUSCULAR; INTRAVENOUS; SUBCUTANEOUS at 23:13

## 2020-08-21 RX ADMIN — Medication 975 MILLIGRAM(S): at 01:49

## 2020-08-21 RX ADMIN — Medication 975 MILLIGRAM(S): at 16:30

## 2020-08-21 RX ADMIN — Medication 975 MILLIGRAM(S): at 17:15

## 2020-08-21 RX ADMIN — ERTAPENEM SODIUM 120 MILLIGRAM(S): 1 INJECTION, POWDER, LYOPHILIZED, FOR SOLUTION INTRAMUSCULAR; INTRAVENOUS at 13:10

## 2020-08-21 RX ADMIN — SODIUM CHLORIDE 3 MILLILITER(S): 9 INJECTION INTRAMUSCULAR; INTRAVENOUS; SUBCUTANEOUS at 13:52

## 2020-08-21 RX ADMIN — Medication 975 MILLIGRAM(S): at 02:30

## 2020-08-21 RX ADMIN — SODIUM CHLORIDE 3 MILLILITER(S): 9 INJECTION INTRAMUSCULAR; INTRAVENOUS; SUBCUTANEOUS at 06:21

## 2020-08-21 RX ADMIN — Medication 975 MILLIGRAM(S): at 07:24

## 2020-08-21 RX ADMIN — Medication 325 MILLIGRAM(S): at 23:14

## 2020-08-21 RX ADMIN — Medication 975 MILLIGRAM(S): at 06:28

## 2020-08-21 RX ADMIN — SIMETHICONE 80 MILLIGRAM(S): 80 TABLET, CHEWABLE ORAL at 17:52

## 2020-08-21 RX ADMIN — Medication 500 MILLIGRAM(S): at 13:52

## 2020-08-21 NOTE — PROVIDER CONTACT NOTE (OTHER) - BACKGROUND
Patient admitted for repeat C/S from 8/16/20 at 04:54 delivering at 35.2 weeks. Patient noted with an EBL of 3500 and a uterine rupture with repair, with B oneal sutures in place.

## 2020-08-21 NOTE — CHART NOTE - NSCHARTNOTEFT_GEN_A_CORE
Patient seen and examined at bedside after nurse notified patient had an elevated temperature (38.8 Rectal).     Patient complains of feeling hot/ burning up. No headaches, confusion, changes in vision , SOB, Chest pain, Abdominal pain     Patient laying in bed with icepacks   CV:RRR   Resp:CTA b/l   Abd: Nontender Gravid , Incision C/D/I Steristrips in place  : Minimal bleeding seen on pad     -Tylenol PO   -Repeat blood cultures   -Monitor temperatures      d/w Dr. Lynda Marques, PGY-1

## 2020-08-21 NOTE — PROVIDER CONTACT NOTE (OTHER) - ASSESSMENT
Patient is warm to touch with mild-moderate gas discomfort/pain noted to incision site. Fundus is midline and firm with light lochia noted. No further s/s of distress noted.

## 2020-08-21 NOTE — PROVIDER CONTACT NOTE (OTHER) - RECOMMENDATIONS
Administer Tylenol as ordered and place ice packs on patient. Continue to monitor
After blood work done pt to start Invanz as per KARYNA Naidu.

## 2020-08-21 NOTE — PROGRESS NOTE ADULT - SUBJECTIVE AND OBJECTIVE BOX
OB Postpartum Note:  Delivery, POD#4    S: 26yo POD#4 s/p LTCS. The patient feels well.  Pain is well controlled. She is tolerating a regular diet and passing flatus. She is voiding spontaneously, and ambulating without difficulty. Denies CP/SOB. Denies lightheadedness/dizziness. Denies N/V.    O:  Vitals:  Vital Signs Last 24 Hrs  T(C): 37.3 (21 Aug 2020 09:47), Max: 38.8 (21 Aug 2020 01:58)  T(F): 99.1 (21 Aug 2020 09:47), Max: 101.9 (21 Aug 2020 01:58)  HR: 101 (21 Aug 2020 09:47) (72 - 101)  BP: 125/65 (21 Aug 2020 09:47) (104/61 - 125/65)  BP(mean): --  RR: 18 (21 Aug 2020 09:47) (16 - 18)  SpO2: 98% (21 Aug 2020 09:47) (98% - 100%)    MEDICATIONS  (STANDING):  acetaminophen   Tablet .. 975 milliGRAM(s) Oral every 6 hours  acetaminophen   Tablet .. 975 milliGRAM(s) Oral once  ascorbic acid 500 milliGRAM(s) Oral daily  diphtheria/tetanus/pertussis (acellular) Vaccine (ADAcel) 0.5 milliLiter(s) IntraMuscular once  ertapenem  IVPB      ertapenem  IVPB 1000 milliGRAM(s) IV Intermittent every 24 hours  ferrous    sulfate 325 milliGRAM(s) Oral three times a day  heparin   Injectable 5000 Unit(s) SubCutaneous every 12 hours  ibuprofen  Tablet. 600 milliGRAM(s) Oral every 6 hours  lactated ringers Bolus 1000 milliLiter(s) IV Bolus once  pantoprazole  Injectable 40 milliGRAM(s) IV Push daily  senna 2 Tablet(s) Oral daily  sodium chloride 0.9% lock flush 3 milliLiter(s) IV Push every 8 hours    MEDICATIONS  (PRN):  diphenhydrAMINE 25 milliGRAM(s) Oral every 6 hours PRN Itching  lanolin Ointment 1 Application(s) Topical every 6 hours PRN Sore Nipples  magnesium hydroxide Suspension 30 milliLiter(s) Oral daily PRN Constipation  oxyCODONE    IR 5 milliGRAM(s) Oral every 4 hours PRN Severe Pain (7 - 10)  simethicone 80 milliGRAM(s) Chew every 4 hours PRN Gas      LABS:  Blood type: O Positive  Rubella IgG: RPR: Negative                          9.3<L>   8.80 >-----------< 228    (  @ 04:10 )             28.9<L>                        9.1<L>   8.01 >-----------< 199    (  @ 14:00 )             28.9<L>                        9.1<L>   7.97 >-----------< 137<L>    (  @ 05:58 )             28.2<L>                  Physical exam:  Gen: NAD  Abdomen: Soft, nontender, no distension , firm uterine fundus at umbilicus.  Incision: Clean, dry, and intact   Pelvic: Normal lochia noted  Ext: No calf tenderness    A/P: 26yo POD#4 s/p LTCS.  Patient is stable and is doing well post-operatively.  - Continue motrin, tylenol, oxycodone PRN for pain control.  - Increase ambulation  - Continue regular diet  - Discharge planning    Kiera Morales MD PGY1 OB Postpartum Note:  Delivery, POD#4    S: 24yo POD#4 s/p LTCS c/b uterine rupture s/p SICU admission for hypotension and endometritis (PPT 38.3 , 38.8 8/). Today, the patient feels well.  Pain is well controlled. She is tolerating a regular diet and passing flatus. She is voiding spontaneously, and ambulating without difficulty. Denies CP/SOB. Denies lightheadedness/dizziness. Denies N/V.    O:  Vitals:  Vital Signs Last 24 Hrs  T(C): 37.3 (21 Aug 2020 09:47), Max: 38.8 (21 Aug 2020 01:58)  T(F): 99.1 (21 Aug 2020 09:47), Max: 101.9 (21 Aug 2020 01:58)  HR: 101 (21 Aug 2020 09:47) (72 - 101)  BP: 125/65 (21 Aug 2020 09:47) (104/61 - 125/65)  BP(mean): --  RR: 18 (21 Aug 2020 09:47) (16 - 18)  SpO2: 98% (21 Aug 2020 09:47) (98% - 100%)    MEDICATIONS  (STANDING):  acetaminophen   Tablet .. 975 milliGRAM(s) Oral every 6 hours  acetaminophen   Tablet .. 975 milliGRAM(s) Oral once  ascorbic acid 500 milliGRAM(s) Oral daily  diphtheria/tetanus/pertussis (acellular) Vaccine (ADAcel) 0.5 milliLiter(s) IntraMuscular once  ertapenem  IVPB      ertapenem  IVPB 1000 milliGRAM(s) IV Intermittent every 24 hours  ferrous    sulfate 325 milliGRAM(s) Oral three times a day  heparin   Injectable 5000 Unit(s) SubCutaneous every 12 hours  ibuprofen  Tablet. 600 milliGRAM(s) Oral every 6 hours  lactated ringers Bolus 1000 milliLiter(s) IV Bolus once  pantoprazole  Injectable 40 milliGRAM(s) IV Push daily  senna 2 Tablet(s) Oral daily  sodium chloride 0.9% lock flush 3 milliLiter(s) IV Push every 8 hours    MEDICATIONS  (PRN):  diphenhydrAMINE 25 milliGRAM(s) Oral every 6 hours PRN Itching  lanolin Ointment 1 Application(s) Topical every 6 hours PRN Sore Nipples  magnesium hydroxide Suspension 30 milliLiter(s) Oral daily PRN Constipation  oxyCODONE    IR 5 milliGRAM(s) Oral every 4 hours PRN Severe Pain (7 - 10)  simethicone 80 milliGRAM(s) Chew every 4 hours PRN Gas      LABS:  Blood type: O Positive  Rubella IgG: RPR: Negative                          9.3<L>   8.80 >-----------< 228    (  @ 04:10 )             28.9<L>                        9.1<L>   8.01 >-----------< 199    (  @ 14:00 )             28.9<L>                        9.1<L>   7.97 >-----------< 137<L>    (  @ 05:58 )             28.2<L>    Physical exam:  Gen: NAD  Abdomen: Soft, nontender, no distension , firm uterine fundus at umbilicus.  Incision: Clean, dry, and intact   Pelvic: Normal lochia noted  Ext: No calf tenderness

## 2020-08-21 NOTE — PROGRESS NOTE ADULT - ASSESSMENT
A/P: 26yo POD#4 s/p LTCS c/b PPT consistent with endometritis.  Patient is stable and is doing well post-operatively.

## 2020-08-22 VITALS
OXYGEN SATURATION: 99 % | SYSTOLIC BLOOD PRESSURE: 106 MMHG | HEART RATE: 84 BPM | DIASTOLIC BLOOD PRESSURE: 66 MMHG | RESPIRATION RATE: 18 BRPM | TEMPERATURE: 98 F

## 2020-08-22 RX ADMIN — ERTAPENEM SODIUM 120 MILLIGRAM(S): 1 INJECTION, POWDER, LYOPHILIZED, FOR SOLUTION INTRAMUSCULAR; INTRAVENOUS at 11:10

## 2020-08-22 RX ADMIN — Medication 325 MILLIGRAM(S): at 13:11

## 2020-08-22 RX ADMIN — Medication 500 MILLIGRAM(S): at 13:10

## 2020-08-22 RX ADMIN — SODIUM CHLORIDE 3 MILLILITER(S): 9 INJECTION INTRAMUSCULAR; INTRAVENOUS; SUBCUTANEOUS at 05:50

## 2020-08-22 RX ADMIN — SIMETHICONE 80 MILLIGRAM(S): 80 TABLET, CHEWABLE ORAL at 06:09

## 2020-08-22 NOTE — PROGRESS NOTE ADULT - SUBJECTIVE AND OBJECTIVE BOX
Post Op C/S/ ruptured uterus      Pt without complaints    Vital Signs Last 24 Hrs  T(C): 37.2 (22 Aug 2020 05:57), Max: 37.3 (21 Aug 2020 09:47)  T(F): 99 (22 Aug 2020 05:57), Max: 99.2 (22 Aug 2020 01:50)  HR: 86 (22 Aug 2020 05:57) (86 - 108)  BP: 113/54 (22 Aug 2020 05:57) (101/59 - 125/65)  BP(mean): --  RR: 17 (22 Aug 2020 05:57) (17 - 18)  SpO2: 99% (22 Aug 2020 05:57) (97% - 99%)  MEDICATIONS  (STANDING):  acetaminophen   Tablet .. 975 milliGRAM(s) Oral every 6 hours  acetaminophen   Tablet .. 975 milliGRAM(s) Oral once  ascorbic acid 500 milliGRAM(s) Oral daily  diphtheria/tetanus/pertussis (acellular) Vaccine (ADAcel) 0.5 milliLiter(s) IntraMuscular once  ertapenem  IVPB      ertapenem  IVPB 1000 milliGRAM(s) IV Intermittent every 24 hours  ferrous    sulfate 325 milliGRAM(s) Oral three times a day  heparin   Injectable 5000 Unit(s) SubCutaneous every 12 hours  ibuprofen  Tablet. 600 milliGRAM(s) Oral every 6 hours  lactated ringers Bolus 1000 milliLiter(s) IV Bolus once  pantoprazole  Injectable 40 milliGRAM(s) IV Push daily  senna 2 Tablet(s) Oral daily  sodium chloride 0.9% lock flush 3 milliLiter(s) IV Push every 8 hours    MEDICATIONS  (PRN):  diphenhydrAMINE 25 milliGRAM(s) Oral every 6 hours PRN Itching  lanolin Ointment 1 Application(s) Topical every 6 hours PRN Sore Nipples  magnesium hydroxide Suspension 30 milliLiter(s) Oral daily PRN Constipation  oxyCODONE    IR 5 milliGRAM(s) Oral every 4 hours PRN Severe Pain (7 - 10)  simethicone 80 milliGRAM(s) Chew every 4 hours PRN Gas        Abdomen soft  fundus firm  Incision clean dry and intact  extremities non tender  lochia wnl                          9.3    8.80  )-----------( 228      ( 21 Aug 2020 04:10 )             28.9     Patient doing well    Routine post operative care

## 2020-08-22 NOTE — PROGRESS NOTE ADULT - SUBJECTIVE AND OBJECTIVE BOX
OB Progress Note:  Delivery, POD#6    S: 26yo POD#6 s/p rLTCS . Her pain is well controlled. She is tolerating a regular diet and had a bowel movement. Denies N/V. Denies CP/SOB/lightheadedness/dizziness. Endorses light vaginal bleeding, less than one pad per hour. She is ambulating without difficulty. Voiding spontaneously.     O:   Vital Signs Last 24 Hrs  T(C): 37.2 (22 Aug 2020 05:57), Max: 37.3 (21 Aug 2020 09:47)  T(F): 99 (22 Aug 2020 05:57), Max: 99.2 (22 Aug 2020 01:50)  HR: 86 (22 Aug 2020 05:57) (86 - 108)  BP: 113/54 (22 Aug 2020 05:57) (101/59 - 125/65)  BP(mean): --  RR: 17 (22 Aug 2020 05:57) (17 - 18)  SpO2: 99% (22 Aug 2020 05:57) (97% - 99%)    Labs:  Blood type: O Positive  Rubella IgG: RPR: Negative                          9.3<L>   8.80 >-----------< 228    (  @ 04:10 )             28.9<L>                        9.1<L>   8.01 >-----------< 199    (  @ 14:00 )             28.9<L>                  PE:  General: NAD  Heart: extremities well-perfused  Lungs: breathing comfortably  Abdomen: Mildly distended, appropriately tender, firm fundus, incision c/d/i  Extremities: No erythema, no pitting edema

## 2020-08-22 NOTE — PROGRESS NOTE ADULT - PROBLEM SELECTOR PLAN 1
- Continue regular diet.  - OOB, Increase ambulation.  - Continue motrin, tylenol, oxycodone PRN for pain control.
Patient brought to the SICU for post-op care. POD0 complained of pleuritic chest pain; EKG NSR, troponins downtrended, CXR wnl. Pain now resolved. Patient recovering well post-operatively and hemodynamically stable.  Slowly getting OOB, passed flatus x1 yesterday  Obtained an NG for 1 hour while in SICU, removed due to discomfort. Patient has been feeling better with improved pain control    Patient counseled to advance to regular diet as tolerated. Has a risk for ileus  Voiding freely  SCD/HSQ for DVT ppx  Encourage amdulation and ISS use  CBC reviewed and appropriate. No need for additional transfusion at this time.     Seen with Dr. Blanche Manzanares MD
- Continue motrin, tylenol, oxycodone PRN for pain control.  - Increase ambulation  - Continue regular diet
- Monitor BP  - Continue motrin, tylenol, oxycodone PRN for pain control.  - Increase ambulation  - Continue regular diet    Kiera Morales MD PGY1

## 2020-08-22 NOTE — PROGRESS NOTE ADULT - PROBLEM SELECTOR PLAN 2
- Has been afebrile for 24 hours   -Urine culture: No growth to date   - Follow up blood cultures   -Continue to monitor vital signs     Ale Marques, PGY1
- Patient nontender on exam and WBC downtrending 13->9.7->8  - f/u BCx, UCx, surgical path  - Invanz until 24 hours afebrile    Kiera Morales MD PGY1

## 2020-08-25 LAB
CULTURE RESULTS: SIGNIFICANT CHANGE UP
SPECIMEN SOURCE: SIGNIFICANT CHANGE UP
SURGICAL PATHOLOGY STUDY: SIGNIFICANT CHANGE UP

## 2020-08-26 LAB
CULTURE RESULTS: SIGNIFICANT CHANGE UP
CULTURE RESULTS: SIGNIFICANT CHANGE UP
SPECIMEN SOURCE: SIGNIFICANT CHANGE UP
SPECIMEN SOURCE: SIGNIFICANT CHANGE UP

## 2020-12-14 ENCOUNTER — RESULT REVIEW (OUTPATIENT)
Age: 25
End: 2020-12-14

## 2021-12-15 ENCOUNTER — RESULT REVIEW (OUTPATIENT)
Age: 26
End: 2021-12-15

## 2022-03-14 NOTE — OB NEONATOLOGY/PEDIATRICIAN DELIVERY SUMMARY - BABY A: APGAR 1 MIN RESP RATE, DELIVERY
(1) weak, irregular
no chills/no decreased eating/drinking/no dizziness/no fever/no nausea/no tingling/no vomiting/no weakness

## 2022-06-28 ENCOUNTER — APPOINTMENT (OUTPATIENT)
Dept: MATERNAL FETAL MEDICINE | Facility: CLINIC | Age: 27
End: 2022-06-28

## 2022-07-15 ENCOUNTER — APPOINTMENT (OUTPATIENT)
Dept: MATERNAL FETAL MEDICINE | Facility: CLINIC | Age: 27
End: 2022-07-15

## 2022-07-19 NOTE — OB RN PATIENT PROFILE - MENTAL HEALTH CONDITIONS/SYMPTOMS, PROFILE
Klisyri Counseling:  I discussed with the patient the risks of Klisyri including but not limited to erythema, scaling, itching, weeping, crusting, and pain. none

## 2022-08-02 ENCOUNTER — APPOINTMENT (OUTPATIENT)
Dept: MATERNAL FETAL MEDICINE | Facility: CLINIC | Age: 27
End: 2022-08-02

## 2022-08-02 ENCOUNTER — ASOB RESULT (OUTPATIENT)
Age: 27
End: 2022-08-02

## 2022-08-02 PROCEDURE — 99203 OFFICE O/P NEW LOW 30 MIN: CPT | Mod: 95

## 2022-09-23 ENCOUNTER — APPOINTMENT (OUTPATIENT)
Dept: OBGYN | Facility: CLINIC | Age: 27
End: 2022-09-23

## 2022-09-23 ENCOUNTER — ASOB RESULT (OUTPATIENT)
Age: 27
End: 2022-09-23

## 2022-09-23 PROCEDURE — 76830 TRANSVAGINAL US NON-OB: CPT

## 2023-03-07 ENCOUNTER — EMERGENCY (EMERGENCY)
Facility: HOSPITAL | Age: 28
LOS: 1 days | Discharge: LEFT BEFORE DISCHARGE | End: 2023-03-07
Admitting: STUDENT IN AN ORGANIZED HEALTH CARE EDUCATION/TRAINING PROGRAM
Payer: MEDICAID

## 2023-03-07 VITALS
SYSTOLIC BLOOD PRESSURE: 117 MMHG | TEMPERATURE: 98 F | OXYGEN SATURATION: 100 % | RESPIRATION RATE: 17 BRPM | HEART RATE: 95 BPM | DIASTOLIC BLOOD PRESSURE: 66 MMHG

## 2023-03-07 LAB
APPEARANCE UR: ABNORMAL
BACTERIA # UR AUTO: NEGATIVE — SIGNIFICANT CHANGE UP
BILIRUB UR-MCNC: NEGATIVE — SIGNIFICANT CHANGE UP
COLOR SPEC: YELLOW — SIGNIFICANT CHANGE UP
DIFF PNL FLD: ABNORMAL
EPI CELLS # UR: 4 /HPF — SIGNIFICANT CHANGE UP (ref 0–5)
GLUCOSE UR QL: NEGATIVE — SIGNIFICANT CHANGE UP
HYALINE CASTS # UR AUTO: 1 /LPF — SIGNIFICANT CHANGE UP (ref 0–7)
KETONES UR-MCNC: ABNORMAL
LEUKOCYTE ESTERASE UR-ACNC: ABNORMAL
NITRITE UR-MCNC: NEGATIVE — SIGNIFICANT CHANGE UP
PH UR: 6.5 — SIGNIFICANT CHANGE UP (ref 5–8)
PROT UR-MCNC: ABNORMAL
RBC CASTS # UR COMP ASSIST: 8 /HPF — HIGH (ref 0–4)
SP GR SPEC: 1.03 — SIGNIFICANT CHANGE UP (ref 1.01–1.05)
UROBILINOGEN FLD QL: SIGNIFICANT CHANGE UP
WBC UR QL: 11 /HPF — HIGH (ref 0–5)

## 2023-03-07 PROCEDURE — 99284 EMERGENCY DEPT VISIT MOD MDM: CPT

## 2023-03-07 RX ORDER — ONDANSETRON 8 MG/1
4 TABLET, FILM COATED ORAL ONCE
Refills: 0 | Status: DISCONTINUED | OUTPATIENT
Start: 2023-03-07 | End: 2023-03-11

## 2023-03-07 RX ORDER — KETOROLAC TROMETHAMINE 30 MG/ML
15 SYRINGE (ML) INJECTION ONCE
Refills: 0 | Status: COMPLETED | OUTPATIENT
Start: 2023-03-07 | End: 2023-03-07

## 2023-03-07 RX ORDER — SODIUM CHLORIDE 9 MG/ML
1000 INJECTION INTRAMUSCULAR; INTRAVENOUS; SUBCUTANEOUS ONCE
Refills: 0 | Status: DISCONTINUED | OUTPATIENT
Start: 2023-03-07 | End: 2023-03-11

## 2023-03-07 RX ORDER — FAMOTIDINE 10 MG/ML
20 INJECTION INTRAVENOUS ONCE
Refills: 0 | Status: DISCONTINUED | OUTPATIENT
Start: 2023-03-07 | End: 2023-03-11

## 2023-03-07 NOTE — ED ADULT TRIAGE NOTE - CHIEF COMPLAINT QUOTE
patient c/o abdominal pain, nausea and vomiting x1 day. patient denies diarrhea, fever, chills. patient denies any PMH.

## 2023-03-07 NOTE — ED PROVIDER NOTE - PHYSICAL EXAMINATION
CONSTITUTIONAL: Well-appearing; well-nourished; in no apparent distress. Non-toxic appearing.   NEURO: Alert & oriented. Gait steady without assistance. Sensory and motor functions are grossly intact.  PSYCH: Mood appropriate. Thought processes intact.   NECK: Supple  CARD: Regular rate and rhythm, no murmurs  RESP: No accessory muscle use; breath sounds clear and equal bilaterally; no wheezes, rhonchi, or rales     ABD: Soft; non-distended; non-tender. No guarding or rebound.   MUSCULOSKELETAL/EXTREMITIES: FROM in all four extremities; no extremity edema.  SKIN: Warm; dry; no apparent lesions or exudate

## 2023-03-07 NOTE — ED ADULT NURSE NOTE - NS ED NURSE ELOPE COMMENTS
Pt stated taking too long & is leaving, informed pt wait for PA, pt walked out before seeing PA for AMA

## 2023-03-07 NOTE — ED PROVIDER NOTE - CLINICAL SUMMARY MEDICAL DECISION MAKING FREE TEXT BOX
27-year-old female with history of uterine rupture presents complaining of nausea, vomiting since last night with associated generalized abdominal pain.  Patient concerned for blood in her vomit prompting ED visit noting that the first time she vomited she saw specks of blood but the most recent time when she vomited she did not.  Patient denies any diarrhea or constipation.  Associated subjective fever and chills.  Denies any sick contacts.  Of note, patient went to a restaurant last night but notes none of her other family numbers are sick.  Patient denies urinary symptoms, vaginal bleeding, or vaginal discharge.  No other voiced complaints.  VSS. Exam as noted above. Likely viral given H&P will obtain labs and treat symptomatically then reassess. Dispo pending.

## 2023-03-07 NOTE — ED PROVIDER NOTE - OBJECTIVE STATEMENT
27-year-old female with history of uterine rupture presents complaining of nausea, vomiting since last night with associated generalized abdominal pain.  Patient concerned for blood in her vomit prompting ED visit noting that the first time she vomited she saw specks of blood but the most recent time when she vomited she did not.  Patient denies any diarrhea or constipation.  Associated subjective fever and chills.  Denies any sick contacts.  Of note, patient went to a restaurant last night but notes none of her other family numbers are sick.  Patient denies urinary symptoms, vaginal bleeding, or vaginal discharge.  No other voiced complaints.

## 2023-03-09 LAB
CULTURE RESULTS: SIGNIFICANT CHANGE UP
SPECIMEN SOURCE: SIGNIFICANT CHANGE UP

## 2024-05-29 ENCOUNTER — APPOINTMENT (OUTPATIENT)
Dept: SURGERY | Facility: CLINIC | Age: 29
End: 2024-05-29
Payer: MEDICAID

## 2024-05-29 VITALS
SYSTOLIC BLOOD PRESSURE: 110 MMHG | DIASTOLIC BLOOD PRESSURE: 75 MMHG | OXYGEN SATURATION: 98 % | BODY MASS INDEX: 28.25 KG/M2 | HEART RATE: 66 BPM | WEIGHT: 180 LBS | RESPIRATION RATE: 17 BRPM | TEMPERATURE: 98.7 F | HEIGHT: 67 IN

## 2024-05-29 DIAGNOSIS — L29.0 PRURITUS ANI: ICD-10-CM

## 2024-05-29 DIAGNOSIS — K59.00 CONSTIPATION, UNSPECIFIED: ICD-10-CM

## 2024-05-29 DIAGNOSIS — K64.8 OTHER HEMORRHOIDS: ICD-10-CM

## 2024-05-29 PROCEDURE — 46600 DIAGNOSTIC ANOSCOPY SPX: CPT

## 2024-05-29 PROCEDURE — 99203 OFFICE O/P NEW LOW 30 MIN: CPT | Mod: 25

## 2024-05-29 RX ORDER — LEVOTHYROXINE SODIUM 0.11 MG/1
112 TABLET ORAL
Refills: 0 | Status: ACTIVE | COMMUNITY

## 2024-05-29 NOTE — CONSULT LETTER
[Dear  ___] : Dear ~ELGIN, [Courtesy Letter:] : I had the pleasure of seeing your patient, [unfilled], in my office today. [Please see my note below.] : Please see my note below. [Consult Closing:] : Thank you very much for allowing me to participate in the care of this patient.  If you have any questions, please do not hesitate to contact me. [Sincerely,] : Sincerely, [FreeTextEntry2] : Dr. Dexter Howell [FreeTextEntry3] : Endy Lorenzo M.D., F.FRANKY.C.S., F.A.S.C.R.S. Chief Colorectal Clinical Services, [facility]

## 2024-05-29 NOTE — PHYSICAL EXAM
[Normal Breath Sounds] : Normal breath sounds [Normal Heart Sounds] : normal heart sounds [No Rash or Lesion] : No rash or lesion [Alert] : alert [Oriented to Person] : oriented to person [Oriented to Place] : oriented to place [Oriented to Time] : oriented to time [Calm] : calm [de-identified] : WNL [de-identified] : WNL [de-identified] : FRITZL [de-identified] : WNL [de-identified] : WNL [FreeTextEntry1] : Perianal inspection demonstrated small external tags.  Small to moderate internal hemorrhoids noted on both digital exam and anoscopy.  Anoscopy performed to evaluate internal hemorrhoids.  No sedation required.

## 2024-05-29 NOTE — HISTORY OF PRESENT ILLNESS
[FreeTextEntry1] : REYNA is a 27 y/o female here for a consultation for hemorrhoids.   Never had colonoscopy.  Today pt reports feeling anal itchiness, discomfort for about a month.    Regular BMs daily, straining, no bleeding.  Does feel anal swelling tissues that sometimes gets smaller.   No episodes of incontinence of stool or flatus.  Good appetite.  No c/o nausea/vomiting.  Denies fever and chills.  Not on anticoagulants.

## 2024-05-29 NOTE — ASSESSMENT
[FreeTextEntry1] : I have seen and evaluated patient and have corroborated all nursing input into this note.  Patient recently changed her diet and has had constipation.  This has resulted in perianal swelling and itch.  Her exam today demonstrated moderate hemorrhoid enlargement.  Since the patient's primary problem is constipation I initially recommended daily MiraLAX.  However, the patient tried this and found it distasteful.  Therefore, she will try daily magnesium.  I reviewed dietary irritants and perianal hygiene.  The patient will follow-up as needed.

## 2024-08-18 NOTE — OB PROVIDER TRIAGE NOTE - NS_STATION_OBGYN_ALL_OB_NU
Pt c/o of bilateral leg pain/swelling/cramps x1 week. Pt has been elevating legs, foot baths, without relief.Denies hx of blood clots. Pt denies chest pain, shortness of breath. Pt states she is on her feet a lot for work. Pt seen at [facility] ER for similar compliant, provided 1 week of \"water pills\"  
-3

## 2025-09-15 ENCOUNTER — APPOINTMENT (OUTPATIENT)
Facility: CLINIC | Age: 30
End: 2025-09-15

## 2025-09-15 VITALS — SYSTOLIC BLOOD PRESSURE: 116 MMHG | DIASTOLIC BLOOD PRESSURE: 75 MMHG

## 2025-09-15 LAB — HCG UR QL: NEGATIVE

## 2025-09-17 LAB — HPV HIGH+LOW RISK DNA PNL CVX: NOT DETECTED

## 2025-09-19 LAB — CYTOLOGY CVX/VAG DOC THIN PREP: ABNORMAL
